# Patient Record
Sex: FEMALE | Race: BLACK OR AFRICAN AMERICAN | NOT HISPANIC OR LATINO | Employment: UNEMPLOYED | URBAN - NONMETROPOLITAN AREA
[De-identification: names, ages, dates, MRNs, and addresses within clinical notes are randomized per-mention and may not be internally consistent; named-entity substitution may affect disease eponyms.]

---

## 2018-01-02 ENCOUNTER — OFFICE VISIT (OUTPATIENT)
Dept: ENDOCRINOLOGY | Facility: CLINIC | Age: 38
End: 2018-01-02

## 2018-01-02 ENCOUNTER — LAB (OUTPATIENT)
Dept: LAB | Facility: HOSPITAL | Age: 38
End: 2018-01-02

## 2018-01-02 VITALS
BODY MASS INDEX: 40.41 KG/M2 | SYSTOLIC BLOOD PRESSURE: 122 MMHG | DIASTOLIC BLOOD PRESSURE: 72 MMHG | HEART RATE: 83 BPM | HEIGHT: 64 IN | WEIGHT: 236.7 LBS

## 2018-01-02 DIAGNOSIS — E89.0 POSTOPERATIVE HYPOTHYROIDISM: Primary | ICD-10-CM

## 2018-01-02 DIAGNOSIS — Z85.850 HISTORY OF THYROID CANCER: ICD-10-CM

## 2018-01-02 DIAGNOSIS — D50.8 OTHER IRON DEFICIENCY ANEMIA: Primary | ICD-10-CM

## 2018-01-02 DIAGNOSIS — E89.0 POSTOPERATIVE HYPOTHYROIDISM: ICD-10-CM

## 2018-01-02 DIAGNOSIS — E55.9 VITAMIN D DEFICIENCY: ICD-10-CM

## 2018-01-02 LAB
25(OH)D3 SERPL-MCNC: 25.8 NG/ML (ref 30–100)
ALBUMIN SERPL-MCNC: 4.1 G/DL (ref 3.4–4.8)
ALBUMIN/GLOB SERPL: 0.9 G/DL (ref 1.1–1.8)
ALP SERPL-CCNC: 102 U/L (ref 38–126)
ALT SERPL W P-5'-P-CCNC: 54 U/L (ref 9–52)
ANION GAP SERPL CALCULATED.3IONS-SCNC: 10 MMOL/L (ref 5–15)
AST SERPL-CCNC: 51 U/L (ref 14–36)
BASOPHILS # BLD AUTO: 0.01 10*3/MM3 (ref 0–0.2)
BASOPHILS NFR BLD AUTO: 0.2 % (ref 0–2)
BILIRUB SERPL-MCNC: 0.3 MG/DL (ref 0.2–1.3)
BUN BLD-MCNC: 8 MG/DL (ref 7–21)
BUN/CREAT SERPL: 11.4 (ref 7–25)
CALCIUM SPEC-SCNC: 8.6 MG/DL (ref 8.4–10.2)
CHLORIDE SERPL-SCNC: 102 MMOL/L (ref 95–110)
CO2 SERPL-SCNC: 26 MMOL/L (ref 22–31)
CREAT BLD-MCNC: 0.7 MG/DL (ref 0.5–1)
DEPRECATED RDW RBC AUTO: 48.3 FL (ref 36.4–46.3)
EOSINOPHIL # BLD AUTO: 0.12 10*3/MM3 (ref 0–0.7)
EOSINOPHIL NFR BLD AUTO: 2 % (ref 0–7)
ERYTHROCYTE [DISTWIDTH] IN BLOOD BY AUTOMATED COUNT: 16.7 % (ref 11.5–14.5)
GFR SERPL CREATININE-BSD FRML MDRD: 115 ML/MIN/1.73 (ref 64–149)
GLOBULIN UR ELPH-MCNC: 4.5 GM/DL (ref 2.3–3.5)
GLUCOSE BLD-MCNC: 80 MG/DL (ref 60–100)
HCT VFR BLD AUTO: 32.4 % (ref 35–45)
HGB BLD-MCNC: 10 G/DL (ref 12–15.5)
IMM GRANULOCYTES # BLD: 0.02 10*3/MM3 (ref 0–0.02)
IMM GRANULOCYTES NFR BLD: 0.3 % (ref 0–0.5)
LYMPHOCYTES # BLD AUTO: 2.43 10*3/MM3 (ref 0.6–4.2)
LYMPHOCYTES NFR BLD AUTO: 39.6 % (ref 10–50)
MCH RBC QN AUTO: 24.5 PG (ref 26.5–34)
MCHC RBC AUTO-ENTMCNC: 30.9 G/DL (ref 31.4–36)
MCV RBC AUTO: 79.4 FL (ref 80–98)
MONOCYTES # BLD AUTO: 0.49 10*3/MM3 (ref 0–0.9)
MONOCYTES NFR BLD AUTO: 8 % (ref 0–12)
NEUTROPHILS # BLD AUTO: 3.06 10*3/MM3 (ref 2–8.6)
NEUTROPHILS NFR BLD AUTO: 49.9 % (ref 37–80)
PLATELET # BLD AUTO: 334 10*3/MM3 (ref 150–450)
PMV BLD AUTO: 9.9 FL (ref 8–12)
POTASSIUM BLD-SCNC: 3.7 MMOL/L (ref 3.5–5.1)
PROT SERPL-MCNC: 8.6 G/DL (ref 6.3–8.6)
RBC # BLD AUTO: 4.08 10*6/MM3 (ref 3.77–5.16)
SODIUM BLD-SCNC: 138 MMOL/L (ref 137–145)
T4 FREE SERPL-MCNC: 1.09 NG/DL (ref 0.78–2.19)
TSH SERPL DL<=0.05 MIU/L-ACNC: 1.92 MIU/ML (ref 0.46–4.68)
WBC NRBC COR # BLD: 6.13 10*3/MM3 (ref 3.2–9.8)

## 2018-01-02 PROCEDURE — 82306 VITAMIN D 25 HYDROXY: CPT

## 2018-01-02 PROCEDURE — 80053 COMPREHEN METABOLIC PANEL: CPT

## 2018-01-02 PROCEDURE — 86800 THYROGLOBULIN ANTIBODY: CPT

## 2018-01-02 PROCEDURE — 84439 ASSAY OF FREE THYROXINE: CPT

## 2018-01-02 PROCEDURE — 84432 ASSAY OF THYROGLOBULIN: CPT

## 2018-01-02 PROCEDURE — 84443 ASSAY THYROID STIM HORMONE: CPT

## 2018-01-02 PROCEDURE — 99205 OFFICE O/P NEW HI 60 MIN: CPT | Performed by: INTERNAL MEDICINE

## 2018-01-02 PROCEDURE — 36415 COLL VENOUS BLD VENIPUNCTURE: CPT

## 2018-01-02 PROCEDURE — 85025 COMPLETE CBC W/AUTO DIFF WBC: CPT

## 2018-01-02 RX ORDER — FERROUS SULFATE 325(65) MG
325 TABLET ORAL
COMMUNITY
End: 2018-05-24 | Stop reason: SDUPTHER

## 2018-01-02 RX ORDER — LEVOTHYROXINE SODIUM 112 UG/1
112 TABLET ORAL DAILY
Qty: 90 TABLET | Refills: 3 | Status: SHIPPED | OUTPATIENT
Start: 2018-01-02 | End: 2018-05-24 | Stop reason: DRUGHIGH

## 2018-01-02 RX ORDER — LEVOTHYROXINE SODIUM 112 UG/1
112 TABLET ORAL DAILY
COMMUNITY
End: 2018-01-02 | Stop reason: SDUPTHER

## 2018-01-02 RX ORDER — OMEGA-3S/DHA/EPA/FISH OIL/D3 300MG-1000
400 CAPSULE ORAL DAILY
COMMUNITY
End: 2019-03-29

## 2018-01-02 NOTE — PROGRESS NOTES
Vickie Whitten is a 37 y.o. female who presents for  evaluation of   Chief Complaint   Patient presents with   • Thyroid Problem        Thyroid Cancer, Postoperative Hypothyroidism    Duration Thyroid Ca Dx in 12-16    Timing - Cancer is cured, now hypothyroid    Quality -  Right 2.5 cm Follicular Ca w capsular invasion but no lymphatic or vascular invasion. Left lobe w pathology for Hashimoto's     Severity -  mild    Complications - none    Current symptoms/problems  none     Alleviating Factors: Compliance with Brand Name synthroid   Side Effects  none        Past Medical History:   Diagnosis Date   • History of thyroid cancer 1/2/2018   • Postoperative hypothyroidism 1/2/2018   • Vitamin D deficiency 1/2/2018     Family History   Problem Relation Age of Onset   • Thyroid cancer Neg Hx      Social History   Substance Use Topics   • Smoking status: Never Smoker   • Smokeless tobacco: Never Used   • Alcohol use None         Current Outpatient Prescriptions:   •  cholecalciferol (VITAMIN D3) 400 units tablet, Take 400 Units by mouth Daily., Disp: , Rfl:   •  ferrous sulfate 325 (65 FE) MG tablet, Take 325 mg by mouth Daily With Breakfast., Disp: , Rfl:   •  levothyroxine (SYNTHROID, LEVOTHROID) 112 MCG tablet, Take 1 tablet by mouth Daily. Brand name synthroid, Disp: 90 tablet, Rfl: 3    Review of Systems    Review of Systems   Constitutional: Negative for activity change, appetite change, chills, diaphoresis, fatigue, fever and unexpected weight change.   HENT: Negative for congestion, dental problem, drooling, ear discharge, ear pain, facial swelling, mouth sores, postnasal drip, rhinorrhea, sinus pressure, sore throat, tinnitus, trouble swallowing and voice change.    Eyes: Negative for photophobia, pain, discharge, redness, itching and visual disturbance.   Respiratory: Negative for apnea, cough, choking, chest tightness, shortness of breath, wheezing and stridor.    Cardiovascular: Negative for chest pain,  "palpitations and leg swelling.   Gastrointestinal: Negative for abdominal distention, abdominal pain, constipation, diarrhea, nausea and vomiting.   Endocrine: Negative for cold intolerance, heat intolerance, polydipsia, polyphagia and polyuria.   Genitourinary: Negative for decreased urine volume, difficulty urinating, dysuria, flank pain, frequency, hematuria and urgency.   Musculoskeletal: Negative for arthralgias, back pain, gait problem, joint swelling, myalgias, neck pain and neck stiffness.   Skin: Negative for color change, pallor, rash and wound.   Allergic/Immunologic: Negative for immunocompromised state.   Neurological: Negative for dizziness, tremors, seizures, syncope, facial asymmetry, speech difficulty, weakness, light-headedness, numbness and headaches.   Hematological: Negative for adenopathy.   Psychiatric/Behavioral: Negative for agitation, behavioral problems, confusion, decreased concentration, dysphoric mood, hallucinations, self-injury, sleep disturbance and suicidal ideas. The patient is not nervous/anxious and is not hyperactive.         Objective:   /72 (BP Location: Right arm, Patient Position: Sitting, Cuff Size: Adult)  Pulse 83  Ht 162.6 cm (64\")  Wt 107 kg (236 lb 11.2 oz)  BMI 40.63 kg/m2    Physical Exam   Constitutional: She is oriented to person, place, and time. She appears well-developed.   HENT:   Head: Normocephalic.   Right Ear: External ear normal.   Left Ear: External ear normal.   Nose: Nose normal.   Eyes: Conjunctivae and EOM are normal. No scleral icterus.   Neck: Normal range of motion. Neck supple. No tracheal deviation present. No thyromegaly present.   Cardiovascular: Normal rate, regular rhythm, normal heart sounds and intact distal pulses.  Exam reveals no gallop and no friction rub.    No murmur heard.  Pulmonary/Chest: Effort normal and breath sounds normal. No stridor. No respiratory distress. She has no wheezes. She has no rales. She exhibits no " tenderness.   Abdominal: Soft. Bowel sounds are normal. She exhibits no distension and no mass. There is no tenderness. There is no rebound and no guarding.   Musculoskeletal: Normal range of motion. She exhibits no tenderness or deformity.   Lymphadenopathy:     She has no cervical adenopathy.   Neurological: She is alert and oriented to person, place, and time. She displays normal reflexes. She exhibits normal muscle tone. Coordination normal.   Skin: No rash noted. No erythema. No pallor.   Psychiatric: She has a normal mood and affect. Her behavior is normal. Judgment and thought content normal.         Lab Review    No results found for: THYROGLOBULN    No results found for: THYROGLB    No results found for: THGAB    Lab Results   Component Value Date    TSH 1.920 01/02/2018       Lab Results   Component Value Date    FREET4 1.09 01/02/2018         Assessment/Plan       ICD-10-CM ICD-9-CM   1. Postoperative hypothyroidism E89.0 244.0   2. History of thyroid cancer Z85.850 V10.87   3. Vitamin D deficiency E55.9 268.9     Presently on Synthroid 112 µg daily.    Stop Synthroid today and in about 3 weeks do total body thyroid scan in [facility] and a thyroid ultrasound.  The week before she is to do a TSH to prove that she is optimized for the scan.    Once she does the thyroid scan she can resume Synthroid and do blood work 4 weeks later.     labs q 3 to 4 months and appts q year     MDM  Number of Diagnoses or Management Options  History of thyroid cancer: new, needed workup  Postoperative hypothyroidism: new, needed workup  Vitamin D deficiency: new, needed workup     Amount and/or Complexity of Data Reviewed  Clinical lab tests: ordered  Tests in the radiology section of CPT®: ordered  Review and summarize past medical records: yes    Risk of Complications, Morbidity, and/or Mortality  Presenting problems: moderate  Diagnostic procedures: minimal  Management options: moderate      3-18    Tg 0.4 with TSH  of 40  NM scan , neck activity    Refer for allen ablation

## 2018-01-02 NOTE — PATIENT INSTRUCTIONS
Labs today to see where we are but you stop the medicine today     In about 3 weeks we will schedule for a whole body scan   Do labs the Thursday before to prove you are ready    The pill is typically given on Wednesdays and the Scan typically done the following Monday     After they do the imaging you resume your thyroid pill    6 weeks after your started your thyroid pill you repeat labs

## 2018-01-10 LAB
THYROGLOB AB SERPL-ACNC: 40.4 IU/ML (ref 0–0.9)
THYROGLOBULIN (TG-RIA): <2 NG/ML

## 2018-02-27 ENCOUNTER — HOSPITAL ENCOUNTER (OUTPATIENT)
Dept: NUCLEAR MEDICINE | Facility: HOSPITAL | Age: 38
Discharge: HOME | End: 2018-02-27

## 2018-02-27 DIAGNOSIS — E55.9 VITAMIN D DEFICIENCY: ICD-10-CM

## 2018-02-27 DIAGNOSIS — Z85.850 HISTORY OF THYROID CANCER: ICD-10-CM

## 2018-02-27 DIAGNOSIS — E89.0 POSTOPERATIVE HYPOTHYROIDISM: ICD-10-CM

## 2018-02-27 PROCEDURE — 0 SODIUM IODIDE CAPSULE: Performed by: INTERNAL MEDICINE

## 2018-02-27 PROCEDURE — A9528 IODINE I-131 IODIDE CAP, DX: HCPCS | Performed by: INTERNAL MEDICINE

## 2018-02-27 RX ADMIN — SODIUM IODIDE I 131 1 CAPSULE: 100 CAPSULE ORAL at 10:11

## 2018-03-01 ENCOUNTER — HOSPITAL ENCOUNTER (OUTPATIENT)
Dept: NUCLEAR MEDICINE | Facility: HOSPITAL | Age: 38
Discharge: HOME | End: 2018-03-01

## 2018-03-01 ENCOUNTER — HOSPITAL ENCOUNTER (OUTPATIENT)
Dept: RADIATION ONCOLOGY | Facility: HOSPITAL | Age: 38
Setting detail: RADIATION/ONCOLOGY SERIES
End: 2018-03-01

## 2018-03-01 ENCOUNTER — TRANSCRIBE ORDERS (OUTPATIENT)
Dept: ADMINISTRATIVE | Facility: HOSPITAL | Age: 38
End: 2018-03-01

## 2018-03-01 PROCEDURE — 78018 THYROID MET IMAGING BODY: CPT

## 2018-03-10 DIAGNOSIS — E89.0 POSTOPERATIVE HYPOTHYROIDISM: ICD-10-CM

## 2018-03-10 DIAGNOSIS — Z85.850 HISTORY OF THYROID CANCER: Primary | ICD-10-CM

## 2018-03-10 DIAGNOSIS — E55.9 VITAMIN D DEFICIENCY: ICD-10-CM

## 2018-03-15 ENCOUNTER — HOSPITAL ENCOUNTER (OUTPATIENT)
Dept: ULTRASOUND IMAGING | Facility: HOSPITAL | Age: 38
Discharge: HOME | End: 2018-03-15
Admitting: INTERNAL MEDICINE

## 2018-03-15 PROCEDURE — 76536 US EXAM OF HEAD AND NECK: CPT

## 2018-03-29 ENCOUNTER — CONSULT (OUTPATIENT)
Dept: RADIATION ONCOLOGY | Facility: HOSPITAL | Age: 38
End: 2018-03-29

## 2018-03-29 VITALS
WEIGHT: 235 LBS | HEIGHT: 64 IN | BODY MASS INDEX: 40.12 KG/M2 | DIASTOLIC BLOOD PRESSURE: 82 MMHG | SYSTOLIC BLOOD PRESSURE: 118 MMHG

## 2018-03-29 DIAGNOSIS — Z85.850 ENCOUNTER FOR FOLLOW-UP SURVEILLANCE OF THYROID CANCER: ICD-10-CM

## 2018-03-29 DIAGNOSIS — E89.0 POSTOPERATIVE HYPOTHYROIDISM: ICD-10-CM

## 2018-03-29 DIAGNOSIS — R22.1 MASS OF THYROID REGION: Primary | ICD-10-CM

## 2018-03-29 DIAGNOSIS — Z78.9 NON-SMOKER: ICD-10-CM

## 2018-03-29 DIAGNOSIS — Z85.850 HISTORY OF THYROID CANCER: ICD-10-CM

## 2018-03-29 DIAGNOSIS — Z08 ENCOUNTER FOR FOLLOW-UP SURVEILLANCE OF THYROID CANCER: ICD-10-CM

## 2018-03-29 PROCEDURE — G0463 HOSPITAL OUTPT CLINIC VISIT: HCPCS | Performed by: RADIOLOGY

## 2018-03-29 RX ORDER — LIOTHYRONINE SODIUM 50 UG/1
50 TABLET ORAL DAILY
Qty: 30 TABLET | Refills: 0 | Status: SHIPPED | OUTPATIENT
Start: 2018-03-29 | End: 2018-05-21

## 2018-04-02 PROBLEM — R22.1 MASS OF THYROID REGION: Status: ACTIVE | Noted: 2018-04-02

## 2018-04-17 ENCOUNTER — TRANSCRIBE ORDERS (OUTPATIENT)
Dept: ADMINISTRATIVE | Facility: HOSPITAL | Age: 38
End: 2018-04-17

## 2018-04-17 ENCOUNTER — HOSPITAL ENCOUNTER (OUTPATIENT)
Dept: RADIATION ONCOLOGY | Facility: HOSPITAL | Age: 38
Setting detail: RADIATION/ONCOLOGY SERIES
End: 2018-04-17

## 2018-04-17 ENCOUNTER — HOSPITAL ENCOUNTER (OUTPATIENT)
Dept: NUCLEAR MEDICINE | Facility: HOSPITAL | Age: 38
Discharge: HOME | End: 2018-04-17

## 2018-04-17 ENCOUNTER — DOCUMENTATION (OUTPATIENT)
Dept: RADIATION ONCOLOGY | Facility: HOSPITAL | Age: 38
End: 2018-04-17

## 2018-04-17 ENCOUNTER — HOSPITAL ENCOUNTER (OUTPATIENT)
Dept: RADIATION ONCOLOGY | Facility: HOSPITAL | Age: 38
Setting detail: RADIATION/ONCOLOGY SERIES
Discharge: HOME | End: 2018-04-17
Attending: RADIOLOGY

## 2018-04-17 ENCOUNTER — LAB (OUTPATIENT)
Dept: LAB | Facility: HOSPITAL | Age: 38
End: 2018-04-17

## 2018-04-17 DIAGNOSIS — E89.0 POSTOPERATIVE HYPOTHYROIDISM: ICD-10-CM

## 2018-04-17 DIAGNOSIS — Z78.9 NON-SMOKER: ICD-10-CM

## 2018-04-17 DIAGNOSIS — R22.1 MASS OF THYROID REGION: ICD-10-CM

## 2018-04-17 DIAGNOSIS — Z85.850 HISTORY OF THYROID CANCER: ICD-10-CM

## 2018-04-17 LAB — HCG SERPL QL: NEGATIVE

## 2018-04-17 PROCEDURE — 36415 COLL VENOUS BLD VENIPUNCTURE: CPT

## 2018-04-17 PROCEDURE — 84703 CHORIONIC GONADOTROPIN ASSAY: CPT | Performed by: PHYSICIAN ASSISTANT

## 2018-04-17 PROCEDURE — 79005 NUCLEAR RX ORAL ADMIN: CPT | Performed by: RADIOLOGY

## 2018-04-17 PROCEDURE — 0 SODIUM IODIDE CAPSULE: Performed by: PHYSICIAN ASSISTANT

## 2018-04-17 PROCEDURE — 77370 RADIATION PHYSICS CONSULT: CPT | Performed by: RADIOLOGY

## 2018-04-17 PROCEDURE — A9517 I131 IODIDE CAP, RX: HCPCS | Performed by: PHYSICIAN ASSISTANT

## 2018-04-17 RX ADMIN — SODIUM IODIDE I 131 1 CAPSULE: 100 CAPSULE ORAL at 10:30

## 2018-04-17 NOTE — PROGRESS NOTES
Patient here for I-131 treatment.   TSH (55.05) and Thyroglobulin (0.5) as well as thyroglobulin AB (107) results from [facility] lab in chart.  Reviewed diet resumption tomorrow night at supper.  Gave copy of I-131 Safety instructions.  Answered questions.  Dr. Winston has spoken with patient.  Consent signed.

## 2018-04-19 ENCOUNTER — HOSPITAL ENCOUNTER (OUTPATIENT)
Dept: NUCLEAR MEDICINE | Facility: HOSPITAL | Age: 38
Discharge: HOME | End: 2018-04-19

## 2018-04-19 DIAGNOSIS — R22.1 MASS OF THYROID REGION: ICD-10-CM

## 2018-04-19 DIAGNOSIS — E89.0 POSTOPERATIVE HYPOTHYROIDISM: ICD-10-CM

## 2018-04-19 DIAGNOSIS — Z85.850 HISTORY OF THYROID CANCER: ICD-10-CM

## 2018-04-19 DIAGNOSIS — Z78.9 NON-SMOKER: ICD-10-CM

## 2018-04-19 PROCEDURE — 78018 THYROID MET IMAGING BODY: CPT

## 2018-05-21 ENCOUNTER — LAB (OUTPATIENT)
Dept: LAB | Facility: HOSPITAL | Age: 38
End: 2018-05-21

## 2018-05-21 ENCOUNTER — OFFICE VISIT (OUTPATIENT)
Dept: ENDOCRINOLOGY | Facility: CLINIC | Age: 38
End: 2018-05-21

## 2018-05-21 VITALS
HEIGHT: 64 IN | DIASTOLIC BLOOD PRESSURE: 78 MMHG | BODY MASS INDEX: 39.09 KG/M2 | WEIGHT: 229 LBS | SYSTOLIC BLOOD PRESSURE: 118 MMHG | HEART RATE: 83 BPM

## 2018-05-21 DIAGNOSIS — E55.9 VITAMIN D DEFICIENCY: ICD-10-CM

## 2018-05-21 DIAGNOSIS — Z85.850 HISTORY OF THYROID CANCER: ICD-10-CM

## 2018-05-21 DIAGNOSIS — E89.0 POSTOPERATIVE HYPOTHYROIDISM: Primary | ICD-10-CM

## 2018-05-21 DIAGNOSIS — E89.0 POSTOPERATIVE HYPOTHYROIDISM: ICD-10-CM

## 2018-05-21 LAB
25(OH)D3 SERPL-MCNC: 38.9 NG/ML (ref 30–100)
ALBUMIN SERPL-MCNC: 4 G/DL (ref 3.4–4.8)
ALBUMIN/GLOB SERPL: 0.9 G/DL (ref 1.1–1.8)
ALP SERPL-CCNC: 81 U/L (ref 38–126)
ALT SERPL W P-5'-P-CCNC: 35 U/L (ref 9–52)
ANION GAP SERPL CALCULATED.3IONS-SCNC: 10 MMOL/L (ref 5–15)
AST SERPL-CCNC: 36 U/L (ref 14–36)
BASOPHILS # BLD AUTO: 0 10*3/MM3 (ref 0–0.2)
BASOPHILS NFR BLD AUTO: 0 % (ref 0–2)
BILIRUB SERPL-MCNC: 0.2 MG/DL (ref 0.2–1.3)
BUN BLD-MCNC: 9 MG/DL (ref 7–21)
BUN/CREAT SERPL: 12 (ref 7–25)
CALCIUM SPEC-SCNC: 8.7 MG/DL (ref 8.4–10.2)
CHLORIDE SERPL-SCNC: 103 MMOL/L (ref 95–110)
CO2 SERPL-SCNC: 29 MMOL/L (ref 22–31)
CREAT BLD-MCNC: 0.75 MG/DL (ref 0.5–1)
DEPRECATED RDW RBC AUTO: 55.7 FL (ref 36.4–46.3)
EOSINOPHIL # BLD AUTO: 0.05 10*3/MM3 (ref 0–0.7)
EOSINOPHIL NFR BLD AUTO: 1.2 % (ref 0–7)
ERYTHROCYTE [DISTWIDTH] IN BLOOD BY AUTOMATED COUNT: 18.8 % (ref 11.5–14.5)
GFR SERPL CREATININE-BSD FRML MDRD: 107 ML/MIN/1.73 (ref 64–149)
GLOBULIN UR ELPH-MCNC: 4.5 GM/DL (ref 2.3–3.5)
GLUCOSE BLD-MCNC: 86 MG/DL (ref 60–100)
HCT VFR BLD AUTO: 29.5 % (ref 35–45)
HGB BLD-MCNC: 9.1 G/DL (ref 12–15.5)
IMM GRANULOCYTES # BLD: 0.01 10*3/MM3 (ref 0–0.02)
IMM GRANULOCYTES NFR BLD: 0.2 % (ref 0–0.5)
IRON 24H UR-MRATE: 22 MCG/DL (ref 37–170)
IRON SATN MFR SERPL: 5 % (ref 15–50)
LYMPHOCYTES # BLD AUTO: 1.52 10*3/MM3 (ref 0.6–4.2)
LYMPHOCYTES NFR BLD AUTO: 37.6 % (ref 10–50)
MCH RBC QN AUTO: 25 PG (ref 26.5–34)
MCHC RBC AUTO-ENTMCNC: 30.8 G/DL (ref 31.4–36)
MCV RBC AUTO: 81 FL (ref 80–98)
MONOCYTES # BLD AUTO: 0.28 10*3/MM3 (ref 0–0.9)
MONOCYTES NFR BLD AUTO: 6.9 % (ref 0–12)
NEUTROPHILS # BLD AUTO: 2.18 10*3/MM3 (ref 2–8.6)
NEUTROPHILS NFR BLD AUTO: 54.1 % (ref 37–80)
PLATELET # BLD AUTO: 238 10*3/MM3 (ref 150–450)
PMV BLD AUTO: 9.3 FL (ref 8–12)
POTASSIUM BLD-SCNC: 3.6 MMOL/L (ref 3.5–5.1)
PROT SERPL-MCNC: 8.5 G/DL (ref 6.3–8.6)
RBC # BLD AUTO: 3.64 10*6/MM3 (ref 3.77–5.16)
SODIUM BLD-SCNC: 142 MMOL/L (ref 137–145)
T4 FREE SERPL-MCNC: 0.8 NG/DL (ref 0.78–2.19)
TIBC SERPL-MCNC: 401 MCG/DL (ref 265–497)
TSH SERPL DL<=0.05 MIU/L-ACNC: 13.5 MIU/ML (ref 0.46–4.68)
WBC NRBC COR # BLD: 4.04 10*3/MM3 (ref 3.2–9.8)

## 2018-05-21 PROCEDURE — 84443 ASSAY THYROID STIM HORMONE: CPT

## 2018-05-21 PROCEDURE — 80053 COMPREHEN METABOLIC PANEL: CPT

## 2018-05-21 PROCEDURE — 83540 ASSAY OF IRON: CPT | Performed by: INTERNAL MEDICINE

## 2018-05-21 PROCEDURE — 82306 VITAMIN D 25 HYDROXY: CPT

## 2018-05-21 PROCEDURE — 99214 OFFICE O/P EST MOD 30 MIN: CPT | Performed by: NURSE PRACTITIONER

## 2018-05-21 PROCEDURE — 86800 THYROGLOBULIN ANTIBODY: CPT

## 2018-05-21 PROCEDURE — 84439 ASSAY OF FREE THYROXINE: CPT

## 2018-05-21 PROCEDURE — 83550 IRON BINDING TEST: CPT | Performed by: INTERNAL MEDICINE

## 2018-05-21 PROCEDURE — 84432 ASSAY OF THYROGLOBULIN: CPT

## 2018-05-21 PROCEDURE — 36415 COLL VENOUS BLD VENIPUNCTURE: CPT | Performed by: INTERNAL MEDICINE

## 2018-05-21 PROCEDURE — 85025 COMPLETE CBC W/AUTO DIFF WBC: CPT

## 2018-05-21 NOTE — PROGRESS NOTES
Subjective    Vickie Whitten is a 37 y.o. female. she is here today for follow-up.    History of Present Illness       Thyroid Cancer, Postoperative Hypothyroidism     Duration Thyroid Ca Dx in 12-16     Timing - Cancer is cured, now hypothyroid     Quality -  Right 2.5 cm Follicular Ca w capsular invasion but no lymphatic or vascular invasion. Left lobe w pathology for Hashimoto's      Severity -  mild     Complications - none     Current symptoms/problems  none      Alleviating Factors: Compliance with Brand Name synthroid   Side Effects  none            Evaluation history:          EXAMINATION:   US HEAD NECK SOFT TISSUE-  3/15/2018 9:51 AM CDT     HISTORY: Status post thyroidectomy     Images are obtained transverse longitudinal direction usual manner.  Right lobe of thyroid gland is surgically absent. Residual tissue is  noted in the left thyroid bed. There is a 6 x 7 x 32 mm residual  fragment of thyroid tissue.     This has uniform sonographic appearance color flow is present.     IMPRESSION 6 x 7 x 32 mm residual tissue in the left neck.  This report was finalized on 03/15/2018 09:52 by Dr. Shashank Alba MD.        History:  37-year-old evaluated for thyroid cancer.     Reference:  Thyroid whole body scan March 1, 2018.     TECHNIQUE:  103 mCi I-131 was administered orally. Following a 48 hour delay  anterior and posterior whole-body images acquired.     FINDINGS:  Thyroid bed activity is noted indicating residual thyroid. Normal  gastrointestinal activity and urinary bladder activity is present. No  abnormal foci of radiotracer to suggest distant metastatic disease.        IMPRESSION:  No evidence of distant metastatic disease.  This report was finalized on 04/19/2018 15:35 by  Soham Buckley, .          TSH   Date Value Ref Range Status   01/02/2018 1.920 0.460 - 4.680 mIU/mL Final     Free T4   Date Value Ref Range Status   01/02/2018 1.09 0.78 - 2.19 ng/dL Final       Current medications:  Current Outpatient  Prescriptions   Medication Sig Dispense Refill   • cholecalciferol (VITAMIN D3) 400 units tablet Take 400 Units by mouth Daily.     • ferrous sulfate 325 (65 FE) MG tablet Take 325 mg by mouth Daily With Breakfast.     • levothyroxine (SYNTHROID, LEVOTHROID) 112 MCG tablet Take 1 tablet by mouth Daily. Brand name synthroid 90 tablet 3   • liothyronine (CYTOMEL) 50 MCG tablet Take 1 tablet by mouth Daily. 30 tablet 0     No current facility-administered medications for this visit.        The following portions of the patient's history were reviewed and updated as appropriate:   Past Medical History:   Diagnosis Date   • History of thyroid cancer 1/2/2018   • Postoperative hypothyroidism 1/2/2018   • Vitamin D deficiency 1/2/2018     No past surgical history on file.  Family History   Problem Relation Age of Onset   • Thyroid cancer Neg Hx      OB History     No data available        No Known Allergies  Social History     Social History   • Marital status: Unknown     Social History Main Topics   • Smoking status: Never Smoker   • Smokeless tobacco: Never Used   • Drug use: Unknown     Other Topics Concern   • Not on file       Review of Systems  Review of Systems   Constitutional: Negative for activity change, appetite change, diaphoresis and fatigue.   HENT: Negative for facial swelling, sneezing, sore throat, tinnitus, trouble swallowing and voice change.    Eyes: Negative for photophobia, pain, discharge, redness, itching and visual disturbance.   Respiratory: Negative for apnea, cough, choking, chest tightness and shortness of breath.    Cardiovascular: Negative for chest pain, palpitations and leg swelling.   Gastrointestinal: Negative for abdominal distention, abdominal pain, constipation, diarrhea, nausea and vomiting.   Endocrine: Negative for cold intolerance, heat intolerance, polydipsia, polyphagia and polyuria.   Genitourinary: Negative for difficulty urinating, dysuria, frequency, hematuria and urgency.  "  Musculoskeletal: Negative for arthralgias, back pain, gait problem, joint swelling, myalgias, neck pain and neck stiffness.   Skin: Negative for color change, pallor, rash and wound.   Neurological: Negative for dizziness, tremors, weakness, light-headedness, numbness and headaches.   Hematological: Negative for adenopathy. Does not bruise/bleed easily.   Psychiatric/Behavioral: Negative for behavioral problems, confusion and sleep disturbance.        Objective    /78 (BP Location: Right arm, Patient Position: Sitting, Cuff Size: Adult)   Pulse 83   Ht 162.6 cm (64\")   Wt 104 kg (229 lb)   BMI 39.31 kg/m²   Physical Exam   Constitutional: She is oriented to person, place, and time. She appears well-developed and well-nourished. No distress.   HENT:   Head: Normocephalic and atraumatic.   Right Ear: External ear normal.   Left Ear: External ear normal.   Nose: Nose normal.   Eyes: Conjunctivae and EOM are normal. Pupils are equal, round, and reactive to light.   Neck: Normal range of motion. Neck supple. No tracheal deviation present.   Thyroid surgically absent   Cardiovascular: Normal rate, regular rhythm and normal heart sounds.    No murmur heard.  Pulmonary/Chest: Effort normal and breath sounds normal. No respiratory distress. She has no wheezes.   Abdominal: Soft. Bowel sounds are normal. There is no tenderness. There is no rebound and no guarding.   Musculoskeletal: Normal range of motion. She exhibits no edema, tenderness or deformity.   Neurological: She is alert and oriented to person, place, and time. No cranial nerve deficit.   Skin: Skin is warm and dry. No rash noted.   Psychiatric: She has a normal mood and affect. Her behavior is normal. Judgment and thought content normal.       Lab Review  Lab Results   Component Value Date    TSH 1.920 01/02/2018     Lab Results   Component Value Date    FREET4 1.09 01/02/2018        Assessment/Plan      1. Postoperative hypothyroidism    2. History of " thyroid cancer    . This diagnosis was discussed and reviewed with the patient including the advantages of drug therapy.     1. Orders placed during this encounter include:  No orders of the defined types were placed in this encounter.      Medications prescribed:  Outpatient Encounter Prescriptions as of 5/21/2018   Medication Sig Dispense Refill   • cholecalciferol (VITAMIN D3) 400 units tablet Take 400 Units by mouth Daily.     • ferrous sulfate 325 (65 FE) MG tablet Take 325 mg by mouth Daily With Breakfast.     • levothyroxine (SYNTHROID, LEVOTHROID) 112 MCG tablet Take 1 tablet by mouth Daily. Brand name synthroid 90 tablet 3   • liothyronine (CYTOMEL) 50 MCG tablet Take 1 tablet by mouth Daily. 30 tablet 0     No facility-administered encounter medications on file as of 5/21/2018.      Hypothyroidism postsurgical    History of thyroid cancer     Presently on Synthroid 112 µg daily.     Lab Results   Component Value Date    TSH 1.920 01/02/2018     Component      Latest Ref Rng & Units 1/2/2018   Thyroglobulin      ng/mL <2.0     Component      Latest Ref Rng & Units 1/2/2018   Thyroglobulin Ab      0.0 - 0.9 IU/mL 40.4 (H)           She saw Dr. Winston     April 17, 2018 received 102.9 MCI I -131      Need new labs today and will call with results      4. No Follow-up on file.

## 2018-05-24 ENCOUNTER — TELEPHONE (OUTPATIENT)
Dept: ENDOCRINOLOGY | Facility: CLINIC | Age: 38
End: 2018-05-24

## 2018-05-24 RX ORDER — LEVOTHYROXINE SODIUM 125 MCG
125 TABLET ORAL DAILY
Qty: 30 TABLET | Refills: 5 | Status: SHIPPED | OUTPATIENT
Start: 2018-05-24 | End: 2018-11-18 | Stop reason: SDUPTHER

## 2018-05-24 RX ORDER — FERROUS SULFATE 325(65) MG
TABLET ORAL
Qty: 60 TABLET | Refills: 5 | Status: SHIPPED | OUTPATIENT
Start: 2018-05-24 | End: 2019-03-29

## 2018-05-24 NOTE — TELEPHONE ENCOUNTER
----- Message from KHADIJAH Sapp sent at 5/24/2018 10:06 AM CDT -----  Increase to 125 mcg ; repeat labs in one month

## 2018-05-27 LAB
THYROGLOB AB SERPL-ACNC: 138.3 IU/ML (ref 0–0.9)
THYROGLOBULIN (TG-RIA): 3.2 NG/ML

## 2018-05-29 DIAGNOSIS — E89.0 POSTOPERATIVE HYPOTHYROIDISM: Primary | ICD-10-CM

## 2018-05-30 ENCOUNTER — TELEPHONE (OUTPATIENT)
Dept: ENDOCRINOLOGY | Facility: CLINIC | Age: 38
End: 2018-05-30

## 2018-07-30 ENCOUNTER — LAB (OUTPATIENT)
Dept: LAB | Facility: HOSPITAL | Age: 38
End: 2018-07-30

## 2018-07-30 DIAGNOSIS — Z85.850 HISTORY OF THYROID CANCER: ICD-10-CM

## 2018-07-30 DIAGNOSIS — E89.0 POSTOPERATIVE HYPOTHYROIDISM: ICD-10-CM

## 2018-07-30 DIAGNOSIS — E55.9 VITAMIN D DEFICIENCY: ICD-10-CM

## 2018-07-30 LAB — TSH SERPL DL<=0.05 MIU/L-ACNC: 0.22 MIU/ML (ref 0.47–4.68)

## 2018-07-30 PROCEDURE — 36415 COLL VENOUS BLD VENIPUNCTURE: CPT

## 2018-07-30 PROCEDURE — 84443 ASSAY THYROID STIM HORMONE: CPT | Performed by: INTERNAL MEDICINE

## 2018-11-19 RX ORDER — LEVOTHYROXINE SODIUM 125 MCG
TABLET ORAL
Qty: 90 TABLET | Refills: 0 | Status: SHIPPED | OUTPATIENT
Start: 2018-11-19 | End: 2019-02-13 | Stop reason: SDUPTHER

## 2019-02-13 RX ORDER — LEVOTHYROXINE SODIUM 125 MCG
TABLET ORAL
Qty: 90 TABLET | Refills: 0 | Status: SHIPPED | OUTPATIENT
Start: 2019-02-13 | End: 2019-04-10 | Stop reason: SDUPTHER

## 2019-03-29 ENCOUNTER — APPOINTMENT (OUTPATIENT)
Dept: LAB | Facility: HOSPITAL | Age: 39
End: 2019-03-29

## 2019-03-29 ENCOUNTER — OFFICE VISIT (OUTPATIENT)
Dept: ENDOCRINOLOGY | Facility: CLINIC | Age: 39
End: 2019-03-29

## 2019-03-29 VITALS
DIASTOLIC BLOOD PRESSURE: 76 MMHG | WEIGHT: 237 LBS | SYSTOLIC BLOOD PRESSURE: 112 MMHG | HEIGHT: 64 IN | OXYGEN SATURATION: 98 % | HEART RATE: 71 BPM | BODY MASS INDEX: 40.46 KG/M2

## 2019-03-29 DIAGNOSIS — E89.0 POSTOPERATIVE HYPOTHYROIDISM: Primary | ICD-10-CM

## 2019-03-29 DIAGNOSIS — E55.9 VITAMIN D DEFICIENCY: ICD-10-CM

## 2019-03-29 DIAGNOSIS — Z85.850 HISTORY OF THYROID CANCER: ICD-10-CM

## 2019-03-29 DIAGNOSIS — D50.8 OTHER IRON DEFICIENCY ANEMIA: ICD-10-CM

## 2019-03-29 PROBLEM — R22.1 MASS OF THYROID REGION: Status: RESOLVED | Noted: 2018-04-02 | Resolved: 2019-03-29

## 2019-03-29 LAB
25(OH)D3 SERPL-MCNC: 27.7 NG/ML (ref 30–100)
ALBUMIN SERPL-MCNC: 4.1 G/DL (ref 3.5–5.2)
ALBUMIN/GLOB SERPL: 1.1 G/DL
ALP SERPL-CCNC: 93 U/L (ref 39–117)
ALT SERPL W P-5'-P-CCNC: 16 U/L (ref 1–33)
ANION GAP SERPL CALCULATED.3IONS-SCNC: 11.3 MMOL/L
AST SERPL-CCNC: 28 U/L (ref 1–32)
BASOPHILS # BLD AUTO: 0.01 10*3/MM3 (ref 0–0.2)
BASOPHILS NFR BLD AUTO: 0.2 % (ref 0–1.5)
BILIRUB SERPL-MCNC: 0.3 MG/DL (ref 0.2–1.2)
BUN BLD-MCNC: 7 MG/DL (ref 6–20)
BUN/CREAT SERPL: 10.1 (ref 7–25)
CALCIUM SPEC-SCNC: 8.5 MG/DL (ref 8.6–10.5)
CHLORIDE SERPL-SCNC: 102 MMOL/L (ref 98–107)
CO2 SERPL-SCNC: 25.7 MMOL/L (ref 22–29)
CREAT BLD-MCNC: 0.69 MG/DL (ref 0.57–1)
DEPRECATED RDW RBC AUTO: 54.4 FL (ref 37–54)
EOSINOPHIL # BLD AUTO: 0.1 10*3/MM3 (ref 0–0.4)
EOSINOPHIL NFR BLD AUTO: 2.2 % (ref 0.3–6.2)
ERYTHROCYTE [DISTWIDTH] IN BLOOD BY AUTOMATED COUNT: 18.8 % (ref 12.3–15.4)
GFR SERPL CREATININE-BSD FRML MDRD: 117 ML/MIN/1.73
GLOBULIN UR ELPH-MCNC: 3.9 GM/DL
GLUCOSE BLD-MCNC: 88 MG/DL (ref 65–99)
HCT VFR BLD AUTO: 32.6 % (ref 34–46.6)
HGB BLD-MCNC: 9.6 G/DL (ref 12–15.9)
IMM GRANULOCYTES # BLD AUTO: 0.02 10*3/MM3 (ref 0–0.05)
IMM GRANULOCYTES NFR BLD AUTO: 0.4 % (ref 0–0.5)
IRON 24H UR-MRATE: 25 MCG/DL (ref 37–145)
IRON SATN MFR SERPL: 6 % (ref 20–50)
LYMPHOCYTES # BLD AUTO: 2.05 10*3/MM3 (ref 0.7–3.1)
LYMPHOCYTES NFR BLD AUTO: 45.4 % (ref 19.6–45.3)
MCH RBC QN AUTO: 23.8 PG (ref 26.6–33)
MCHC RBC AUTO-ENTMCNC: 29.4 G/DL (ref 31.5–35.7)
MCV RBC AUTO: 80.7 FL (ref 79–97)
MONOCYTES # BLD AUTO: 0.3 10*3/MM3 (ref 0.1–0.9)
MONOCYTES NFR BLD AUTO: 6.6 % (ref 5–12)
NEUTROPHILS # BLD AUTO: 2.04 10*3/MM3 (ref 1.4–7)
NEUTROPHILS NFR BLD AUTO: 45.2 % (ref 42.7–76)
NRBC BLD AUTO-RTO: 0 /100 WBC (ref 0–0)
PLATELET # BLD AUTO: 328 10*3/MM3 (ref 140–450)
PMV BLD AUTO: 10.4 FL (ref 6–12)
POTASSIUM BLD-SCNC: 3.5 MMOL/L (ref 3.5–5.2)
PROT SERPL-MCNC: 8 G/DL (ref 6–8.5)
RBC # BLD AUTO: 4.04 10*6/MM3 (ref 3.77–5.28)
SODIUM BLD-SCNC: 139 MMOL/L (ref 136–145)
TIBC SERPL-MCNC: 444 MCG/DL (ref 298–536)
TRANSFERRIN SERPL-MCNC: 298 MG/DL (ref 200–360)
TSH SERPL DL<=0.05 MIU/L-ACNC: 2.46 MIU/ML (ref 0.27–4.2)
VIT B12 BLD-MCNC: 576 PG/ML (ref 211–946)
WBC NRBC COR # BLD: 4.52 10*3/MM3 (ref 3.4–10.8)

## 2019-03-29 PROCEDURE — 99214 OFFICE O/P EST MOD 30 MIN: CPT | Performed by: INTERNAL MEDICINE

## 2019-03-29 PROCEDURE — 84432 ASSAY OF THYROGLOBULIN: CPT | Performed by: INTERNAL MEDICINE

## 2019-03-29 PROCEDURE — 84443 ASSAY THYROID STIM HORMONE: CPT | Performed by: INTERNAL MEDICINE

## 2019-03-29 PROCEDURE — 80053 COMPREHEN METABOLIC PANEL: CPT | Performed by: INTERNAL MEDICINE

## 2019-03-29 PROCEDURE — 36415 COLL VENOUS BLD VENIPUNCTURE: CPT | Performed by: INTERNAL MEDICINE

## 2019-03-29 PROCEDURE — 82525 ASSAY OF COPPER: CPT | Performed by: INTERNAL MEDICINE

## 2019-03-29 PROCEDURE — 82607 VITAMIN B-12: CPT | Performed by: INTERNAL MEDICINE

## 2019-03-29 PROCEDURE — 82306 VITAMIN D 25 HYDROXY: CPT | Performed by: INTERNAL MEDICINE

## 2019-03-29 PROCEDURE — 84466 ASSAY OF TRANSFERRIN: CPT | Performed by: INTERNAL MEDICINE

## 2019-03-29 PROCEDURE — 86800 THYROGLOBULIN ANTIBODY: CPT | Performed by: INTERNAL MEDICINE

## 2019-03-29 PROCEDURE — 83540 ASSAY OF IRON: CPT | Performed by: INTERNAL MEDICINE

## 2019-03-29 PROCEDURE — 85025 COMPLETE CBC W/AUTO DIFF WBC: CPT | Performed by: INTERNAL MEDICINE

## 2019-03-29 NOTE — PROGRESS NOTES
Subjective    Vickie Whitten is a 38 y.o. female. she is here today for follow-up.    Hypothyroidism   Pertinent negatives include no abdominal pain, arthralgias, chest pain, coughing, diaphoresis, fatigue, headaches, joint swelling, myalgias, nausea, neck pain, numbness, rash, sore throat, vomiting or weakness.          Thyroid Cancer, Postoperative Hypothyroidism     Duration Thyroid Ca Dx in 12-16     Timing - Cancer is cured, now hypothyroid     Quality -  Right 2.5 cm Follicular Ca w capsular invasion but no lymphatic or vascular invasion. Left lobe w pathology for Hashimoto's      Severity -  mild     Complications - none     Current symptoms/problems  none      Alleviating Factors: Compliance with Brand Name synthroid   Side Effects  none            Evaluation history:          EXAMINATION:   US HEAD NECK SOFT TISSUE-  3/15/2018 9:51 AM CDT     HISTORY: Status post thyroidectomy     Images are obtained transverse longitudinal direction usual manner.  Right lobe of thyroid gland is surgically absent. Residual tissue is  noted in the left thyroid bed. There is a 6 x 7 x 32 mm residual  fragment of thyroid tissue.     This has uniform sonographic appearance color flow is present.     IMPRESSION 6 x 7 x 32 mm residual tissue in the left neck.  This report was finalized on 03/15/2018 09:52 by Dr. Shashank Alba MD.        History:  37-year-old evaluated for thyroid cancer.     Reference:  Thyroid whole body scan March 1, 2018.     TECHNIQUE:  103 mCi I-131 was administered orally. Following a 48 hour delay  anterior and posterior whole-body images acquired.     FINDINGS:  Thyroid bed activity is noted indicating residual thyroid. Normal  gastrointestinal activity and urinary bladder activity is present. No  abnormal foci of radiotracer to suggest distant metastatic disease.        IMPRESSION:  No evidence of distant metastatic disease.  This report was finalized on 04/19/2018 15:35 by  Soham Buckley, .          TSH    Date Value Ref Range Status   03/29/2019 2.460 0.270 - 4.200 mIU/mL Final     Free T4   Date Value Ref Range Status   05/21/2018 0.80 0.78 - 2.19 ng/dL Final       Current medications:  Current Outpatient Medications   Medication Sig Dispense Refill   • Prenatal Multivit-Min-Fe-FA (PRENATAL 1 + IRON PO) Take  by mouth.     • SYNTHROID 125 MCG tablet TAKE ONE TABLET BY MOUTH DAILY 90 tablet 0     No current facility-administered medications for this visit.        The following portions of the patient's history were reviewed and updated as appropriate:   Past Medical History:   Diagnosis Date   • History of thyroid cancer 1/2/2018   • Postoperative hypothyroidism 1/2/2018   • Vitamin D deficiency 1/2/2018     No past surgical history on file.  Family History   Problem Relation Age of Onset   • Thyroid cancer Neg Hx      OB History     No data available        No Known Allergies  Social History     Socioeconomic History   • Marital status:      Spouse name: Not on file   • Number of children: Not on file   • Years of education: Not on file   • Highest education level: Not on file   Tobacco Use   • Smoking status: Never Smoker   • Smokeless tobacco: Never Used       Review of Systems  Review of Systems   Constitutional: Negative for activity change, appetite change, diaphoresis and fatigue.   HENT: Negative for facial swelling, sneezing, sore throat, tinnitus, trouble swallowing and voice change.    Eyes: Negative for photophobia, pain, discharge, redness, itching and visual disturbance.   Respiratory: Negative for apnea, cough, choking, chest tightness and shortness of breath.    Cardiovascular: Negative for chest pain, palpitations and leg swelling.   Gastrointestinal: Negative for abdominal distention, abdominal pain, constipation, diarrhea, nausea and vomiting.   Endocrine: Negative for cold intolerance, heat intolerance, polydipsia, polyphagia and polyuria.   Genitourinary: Negative for difficulty  "urinating, dysuria, frequency, hematuria and urgency.   Musculoskeletal: Negative for arthralgias, back pain, gait problem, joint swelling, myalgias, neck pain and neck stiffness.   Skin: Negative for color change, pallor, rash and wound.   Neurological: Negative for dizziness, tremors, weakness, light-headedness, numbness and headaches.   Hematological: Negative for adenopathy. Does not bruise/bleed easily.   Psychiatric/Behavioral: Negative for behavioral problems, confusion and sleep disturbance.        Objective    /76   Pulse 71   Ht 162.6 cm (64\")   Wt 108 kg (237 lb)   SpO2 98%   BMI 40.68 kg/m²   Physical Exam   Constitutional: She is oriented to person, place, and time. She appears well-developed and well-nourished. No distress.   HENT:   Head: Normocephalic and atraumatic.   Right Ear: External ear normal.   Left Ear: External ear normal.   Nose: Nose normal.   Eyes: Conjunctivae and EOM are normal. Pupils are equal, round, and reactive to light.   Neck: Normal range of motion. Neck supple. No tracheal deviation present.   Thyroid surgically absent   Cardiovascular: Normal rate, regular rhythm and normal heart sounds.   No murmur heard.  Pulmonary/Chest: Effort normal and breath sounds normal. No respiratory distress. She has no wheezes.   Abdominal: Soft. Bowel sounds are normal. There is no tenderness. There is no rebound and no guarding.   Musculoskeletal: Normal range of motion. She exhibits no edema, tenderness or deformity.   Neurological: She is alert and oriented to person, place, and time. No cranial nerve deficit.   Skin: Skin is warm and dry. No rash noted.   Psychiatric: She has a normal mood and affect. Her behavior is normal. Judgment and thought content normal.       Lab Review  Lab Results   Component Value Date    TSH 2.460 03/29/2019     Lab Results   Component Value Date    FREET4 0.80 05/21/2018        Assessment/Plan      1. Postoperative hypothyroidism    2. History of " thyroid cancer    3. Vitamin D deficiency    4. Other iron deficiency anemia    . This diagnosis was discussed and reviewed with the patient including the advantages of drug therapy.     1. Orders placed during this encounter include:  Orders Placed This Encounter   Procedures   • US Thyroid     Scheduling Instructions:      Wendie Rivera     Order Specific Question:   Reason for Exam:     Answer:   h o thyroid cancer   • TSH   • Thyroglobulin With Anti-TG   • Comprehensive Metabolic Panel   • Vitamin D 25 Hydroxy   • Vitamin B12   • Iron Profile   • Copper, Free   • CBC Auto Differential   • Thyroglobulin By DAE     Standing Status:   Future     Number of Occurrences:   1     Standing Expiration Date:   4/7/2020   • CBC & Differential     Order Specific Question:   Manual Differential     Answer:   No       Medications prescribed:  Outpatient Encounter Medications as of 3/29/2019   Medication Sig Dispense Refill   • Prenatal Multivit-Min-Fe-FA (PRENATAL 1 + IRON PO) Take  by mouth.     • SYNTHROID 125 MCG tablet TAKE ONE TABLET BY MOUTH DAILY 90 tablet 0   • [DISCONTINUED] cholecalciferol (VITAMIN D3) 400 units tablet Take 400 Units by mouth Daily.     • [DISCONTINUED] ferrous sulfate 325 (65 FE) MG tablet 1 tab po bid 60 tablet 5     No facility-administered encounter medications on file as of 3/29/2019.      Hypothyroidism postsurgical    History of thyroid cancer     Presently on Synthroid 125     No results found for: THYROGLOBULN    No results found for: THYROGLB    Lab Results   Component Value Date    TGRIA <2.0 03/29/2019    TGRIA 3.2 05/21/2018    TGRIA <2.0 01/02/2018       Lab Results   Component Value Date    THGAB 14.5 (H) 03/29/2019    THGAB 138.3 (H) 05/21/2018    THGAB 40.4 (H) 01/02/2018       Lab Results   Component Value Date    TSH 2.460 03/29/2019    TSH 0.217 (L) 07/30/2018    TSH 13.500 (H) 05/21/2018       Lab Results   Component Value Date    FREET4 0.80 05/21/2018    FREET4 1.09  01/02/2018             Follows w  Dr. Winston     April 17, 2018 received 102.9 MCI I -131    Low iron , on prenatal, taking apart from synthroid     I will refill synthroid through [facility] 125 mcgs daily    --    Low iron persists   She can add ferrous sulfate 325 mg more per day but take later in the day     Nl copper  --    Low vitamin D and low calcium  Suggest 50 th u of vitamin D twice monthly and calcium citrate 400 mg with supper    Repeat labs before next appt           4. Return in about 6 months (around 9/29/2019).

## 2019-04-04 ENCOUNTER — HOSPITAL ENCOUNTER (OUTPATIENT)
Dept: ULTRASOUND IMAGING | Facility: HOSPITAL | Age: 39
Discharge: HOME | End: 2019-04-04
Admitting: INTERNAL MEDICINE

## 2019-04-04 PROCEDURE — 76536 US EXAM OF HEAD AND NECK: CPT

## 2019-04-05 LAB — COPPER, FREE: 590 MCG/L

## 2019-04-07 LAB
THYROGLOB AB SERPL-ACNC: 14.5 IU/ML (ref 0–0.9)
THYROGLOBULIN (TG-RIA): <2 NG/ML

## 2019-04-10 RX ORDER — LEVOTHYROXINE SODIUM 125 MCG
125 TABLET ORAL DAILY
Qty: 90 TABLET | Refills: 3 | Status: SHIPPED | OUTPATIENT
Start: 2019-04-10 | End: 2019-09-30 | Stop reason: SDUPTHER

## 2019-09-30 ENCOUNTER — OFFICE VISIT (OUTPATIENT)
Dept: ENDOCRINOLOGY | Facility: CLINIC | Age: 39
End: 2019-09-30

## 2019-09-30 ENCOUNTER — LAB (OUTPATIENT)
Dept: LAB | Facility: HOSPITAL | Age: 39
End: 2019-09-30

## 2019-09-30 VITALS
HEART RATE: 77 BPM | WEIGHT: 232 LBS | DIASTOLIC BLOOD PRESSURE: 72 MMHG | BODY MASS INDEX: 39.61 KG/M2 | SYSTOLIC BLOOD PRESSURE: 114 MMHG | HEIGHT: 64 IN | OXYGEN SATURATION: 99 %

## 2019-09-30 DIAGNOSIS — Z85.850 HISTORY OF THYROID CANCER: ICD-10-CM

## 2019-09-30 DIAGNOSIS — E89.0 POSTOPERATIVE HYPOTHYROIDISM: Primary | ICD-10-CM

## 2019-09-30 DIAGNOSIS — D50.8 OTHER IRON DEFICIENCY ANEMIA: ICD-10-CM

## 2019-09-30 DIAGNOSIS — E89.0 POSTOPERATIVE HYPOTHYROIDISM: ICD-10-CM

## 2019-09-30 DIAGNOSIS — E55.9 VITAMIN D DEFICIENCY: ICD-10-CM

## 2019-09-30 LAB
25(OH)D3 SERPL-MCNC: 46.5 NG/ML (ref 30–100)
IRON 24H UR-MRATE: 96 MCG/DL (ref 37–145)
IRON SATN MFR SERPL: 21 % (ref 20–50)
T4 FREE SERPL-MCNC: 1.08 NG/DL (ref 0.93–1.7)
TIBC SERPL-MCNC: 463 MCG/DL (ref 298–536)
TRANSFERRIN SERPL-MCNC: 311 MG/DL (ref 200–360)
TSH SERPL DL<=0.05 MIU/L-ACNC: 7.46 UIU/ML (ref 0.27–4.2)
VIT B12 BLD-MCNC: 431 PG/ML (ref 211–946)

## 2019-09-30 PROCEDURE — 84439 ASSAY OF FREE THYROXINE: CPT

## 2019-09-30 PROCEDURE — 84466 ASSAY OF TRANSFERRIN: CPT

## 2019-09-30 PROCEDURE — 82607 VITAMIN B-12: CPT

## 2019-09-30 PROCEDURE — 84443 ASSAY THYROID STIM HORMONE: CPT

## 2019-09-30 PROCEDURE — 82306 VITAMIN D 25 HYDROXY: CPT

## 2019-09-30 PROCEDURE — 83540 ASSAY OF IRON: CPT

## 2019-09-30 PROCEDURE — 36415 COLL VENOUS BLD VENIPUNCTURE: CPT

## 2019-09-30 PROCEDURE — 86800 THYROGLOBULIN ANTIBODY: CPT

## 2019-09-30 PROCEDURE — 84432 ASSAY OF THYROGLOBULIN: CPT

## 2019-09-30 PROCEDURE — 99214 OFFICE O/P EST MOD 30 MIN: CPT | Performed by: INTERNAL MEDICINE

## 2019-09-30 RX ORDER — LEVOTHYROXINE SODIUM 125 MCG
125 TABLET ORAL DAILY
Qty: 90 TABLET | Refills: 3 | Status: SHIPPED | OUTPATIENT
Start: 2019-09-30 | End: 2019-10-02

## 2019-09-30 RX ORDER — FERROUS SULFATE TAB EC 324 MG (65 MG FE EQUIVALENT) 324 (65 FE) MG
324 TABLET DELAYED RESPONSE ORAL
COMMUNITY

## 2019-09-30 NOTE — PROGRESS NOTES
Subjective    Vickie Whitten is a 39 y.o. female. she is here today for follow-up.    Hypothyroidism   Pertinent negatives include no abdominal pain, arthralgias, chest pain, coughing, diaphoresis, fatigue, headaches, joint swelling, myalgias, nausea, neck pain, numbness, rash, sore throat, vomiting or weakness.          Thyroid Cancer, Postoperative Hypothyroidism     Duration Thyroid Ca Dx in 12-16     Timing - Underwent total thyroidectomy in 12-16     Quality -  Right 2.5 cm Follicular Ca w capsular invasion but no lymphatic or vascular invasion. Left lobe w pathology for Hashimoto's      Severity -  Mild upon presentation      Complications - none     Current symptoms/problems  none      Alleviating Factors: Compliance with Brand Name synthroid     March 2018 - had 0.6 x 0.7 x 3.2 cm left residual thyroid tissue that had uptake on WBS  Underwent YOUNG ablation in April 2018 April 2019 , negative ultrasound    Now detectable Tg but elevated TSH         No results found for: THYROGLOBULN    No results found for: THYROGLB    Lab Results   Component Value Date    TGRIA 2.7 09/30/2019    TGRIA <2.0 03/29/2019    TGRIA 3.2 05/21/2018       Lab Results   Component Value Date    THGAB 9.6 (H) 09/30/2019    THGAB 14.5 (H) 03/29/2019    THGAB 138.3 (H) 05/21/2018       Lab Results   Component Value Date    TSH 7.460 (H) 09/30/2019    TSH 2.460 03/29/2019    TSH 0.217 (L) 07/30/2018       Lab Results   Component Value Date    FREET4 1.08 09/30/2019    FREET4 0.80 05/21/2018         Current medications:  Current Outpatient Medications   Medication Sig Dispense Refill   • calcium carbonate-cholecalciferol 500-400 MG-UNIT tablet tablet Take  by mouth Daily.     • Cholecalciferol 41975 units tablet 50 thousand units po q 2 weeks 2 tablet 11   • ferrous sulfate 324 (65 Fe) MG tablet delayed-release EC tablet Take 324 mg by mouth Daily With Breakfast.     • Multiple Vitamins-Minerals (HAIR SKIN AND NAILS FORMULA PO) Take  by  mouth.     • Prenatal Multivit-Min-Fe-FA (PRENATAL 1 + IRON PO) Take  by mouth.     • SYNTHROID 150 MCG tablet Take 1 tablet by mouth Daily. 30 tablet 11     No current facility-administered medications for this visit.        The following portions of the patient's history were reviewed and updated as appropriate:   Past Medical History:   Diagnosis Date   • History of thyroid cancer 1/2/2018   • Postoperative hypothyroidism 1/2/2018   • Vitamin D deficiency 1/2/2018     No past surgical history on file.  Family History   Problem Relation Age of Onset   • Thyroid cancer Neg Hx      OB History     No data available        No Known Allergies  Social History     Socioeconomic History   • Marital status:      Spouse name: Not on file   • Number of children: Not on file   • Years of education: Not on file   • Highest education level: Not on file   Tobacco Use   • Smoking status: Never Smoker   • Smokeless tobacco: Never Used       Review of Systems  Review of Systems   Constitutional: Negative for activity change, appetite change, diaphoresis and fatigue.   HENT: Negative for facial swelling, sneezing, sore throat, tinnitus, trouble swallowing and voice change.    Eyes: Negative for photophobia, pain, discharge, redness, itching and visual disturbance.   Respiratory: Negative for apnea, cough, choking, chest tightness and shortness of breath.    Cardiovascular: Negative for chest pain, palpitations and leg swelling.   Gastrointestinal: Negative for abdominal distention, abdominal pain, constipation, diarrhea, nausea and vomiting.   Endocrine: Negative for cold intolerance, heat intolerance, polydipsia, polyphagia and polyuria.   Genitourinary: Negative for difficulty urinating, dysuria, frequency, hematuria and urgency.   Musculoskeletal: Negative for arthralgias, back pain, gait problem, joint swelling, myalgias, neck pain and neck stiffness.   Skin: Negative for color change, pallor, rash and wound.  "  Neurological: Negative for dizziness, tremors, weakness, light-headedness, numbness and headaches.   Hematological: Negative for adenopathy. Does not bruise/bleed easily.   Psychiatric/Behavioral: Negative for behavioral problems, confusion and sleep disturbance.        Objective    /72   Pulse 77   Ht 162.6 cm (64\")   Wt 105 kg (232 lb)   SpO2 99%   BMI 39.82 kg/m²   Physical Exam   Constitutional: She is oriented to person, place, and time. She appears well-developed and well-nourished. No distress.   HENT:   Head: Normocephalic and atraumatic.   Right Ear: External ear normal.   Left Ear: External ear normal.   Nose: Nose normal.   Eyes: Conjunctivae and EOM are normal. Pupils are equal, round, and reactive to light.   Neck: Normal range of motion. Neck supple. No tracheal deviation present.   Thyroid surgically absent   Cardiovascular: Normal rate, regular rhythm and normal heart sounds.   No murmur heard.  Pulmonary/Chest: Effort normal and breath sounds normal. No respiratory distress. She has no wheezes.   Abdominal: Soft. Bowel sounds are normal. There is no tenderness. There is no rebound and no guarding.   Musculoskeletal: Normal range of motion. She exhibits no edema, tenderness or deformity.   Neurological: She is alert and oriented to person, place, and time. No cranial nerve deficit.   Skin: Skin is warm and dry. No rash noted.   Psychiatric: She has a normal mood and affect. Her behavior is normal. Judgment and thought content normal.       Lab Review  Lab Results   Component Value Date    TSH 7.460 (H) 09/30/2019     Lab Results   Component Value Date    FREET4 1.08 09/30/2019        Assessment/Plan      1. Postoperative hypothyroidism    2. History of thyroid cancer    3. Vitamin D deficiency    4. Other iron deficiency anemia    . This diagnosis was discussed and reviewed with the patient including the advantages of drug therapy.     1. Orders placed during this encounter " include:  Orders Placed This Encounter   Procedures   • Iron Profile     Standing Status:   Standing     Number of Occurrences:   2     Standing Expiration Date:   9/30/2020   • Vitamin B12     Standing Status:   Standing     Number of Occurrences:   2     Standing Expiration Date:   9/30/2020   • Vitamin D 25 Hydroxy     Standing Status:   Standing     Number of Occurrences:   2     Standing Expiration Date:   9/30/2020   • Thyroglobulin With Anti-TG     Standing Status:   Standing     Number of Occurrences:   2     Standing Expiration Date:   9/30/2020   • T4, Free     Standing Status:   Standing     Number of Occurrences:   2     Standing Expiration Date:   9/30/2020   • TSH     Standing Status:   Standing     Number of Occurrences:   2     Standing Expiration Date:   9/30/2020       Medications prescribed:  Outpatient Encounter Medications as of 9/30/2019   Medication Sig Dispense Refill   • calcium carbonate-cholecalciferol 500-400 MG-UNIT tablet tablet Take  by mouth Daily.     • Cholecalciferol 40360 units tablet 50 thousand units po q 2 weeks 2 tablet 11   • ferrous sulfate 324 (65 Fe) MG tablet delayed-release EC tablet Take 324 mg by mouth Daily With Breakfast.     • Multiple Vitamins-Minerals (HAIR SKIN AND NAILS FORMULA PO) Take  by mouth.     • Prenatal Multivit-Min-Fe-FA (PRENATAL 1 + IRON PO) Take  by mouth.     • [DISCONTINUED] SYNTHROID 125 MCG tablet Take 1 tablet by mouth Daily. Brand name only 90 tablet 3   • [DISCONTINUED] SYNTHROID 125 MCG tablet Take 1 tablet by mouth Daily. Brand name only 90 tablet 3     No facility-administered encounter medications on file as of 9/30/2019.      Hypothyroidism postsurgical    History of thyroid cancer , low risk     Tot thyroidectomy in 2016    March / April 2018 , left 3.2 cm residual thyroid tissue with uptake on scan    April 2018 - 100 MCI of YOUNG ablation    April 2019 - neg ultrasound    Now elevated Tg with elevated TSH ( indeterminate / incomplete )  response    Suppress TSH to less than 0.1    If Tg keeps being detectable , I will repeat thyroid ultrasound and whole body scan          No results found for: THYROGLOBULN    No results found for: THYROGLB    Lab Results   Component Value Date    TGRIA 2.7 09/30/2019    TGRIA <2.0 03/29/2019    TGRIA 3.2 05/21/2018       Lab Results   Component Value Date    THGAB 9.6 (H) 09/30/2019    THGAB 14.5 (H) 03/29/2019    THGAB 138.3 (H) 05/21/2018       Lab Results   Component Value Date    TSH 7.460 (H) 09/30/2019    TSH 2.460 03/29/2019    TSH 0.217 (L) 07/30/2018       Lab Results   Component Value Date    FREET4 1.08 09/30/2019    FREET4 0.80 05/21/2018          Low iron , on prenatal, taking apart from synthroid          --    Lab Results   Component Value Date    IRON 96 09/30/2019    TIBC 463 09/30/2019       Low iron persists   She can add ferrous sulfate 325 mg more per day but take later in the day     Nl copper  --    Lab Results   Component Value Date    LFZV02GI 46.5 09/30/2019    OJTL13SJ 27.7 (L) 03/29/2019           Low vitamin D and low calcium  Suggest 50 th u of vitamin D twice monthly and calcium citrate 400 mg with supper    Repeat labs before next appt           4. Return in about 6 months (around 3/30/2020).

## 2019-10-02 DIAGNOSIS — E89.0 POSTOPERATIVE HYPOTHYROIDISM: Primary | ICD-10-CM

## 2019-10-02 DIAGNOSIS — Z85.850 HISTORY OF THYROID CANCER: ICD-10-CM

## 2019-10-02 RX ORDER — LEVOTHYROXINE SODIUM 150 MCG
150 TABLET ORAL DAILY
Qty: 30 TABLET | Refills: 11 | Status: SHIPPED | OUTPATIENT
Start: 2019-10-02 | End: 2020-06-08 | Stop reason: SDUPTHER

## 2019-10-06 LAB
THYROGLOB AB SERPL-ACNC: 9.6 IU/ML (ref 0–0.9)
THYROGLOBULIN (TG-RIA): 2.7 NG/ML

## 2020-05-04 ENCOUNTER — HOSPITAL ENCOUNTER (OUTPATIENT)
Dept: ULTRASOUND IMAGING | Facility: HOSPITAL | Age: 40
Discharge: HOME | End: 2020-05-04
Admitting: INTERNAL MEDICINE

## 2020-05-04 DIAGNOSIS — E89.0 POSTOPERATIVE HYPOTHYROIDISM: ICD-10-CM

## 2020-05-04 DIAGNOSIS — Z85.850 HISTORY OF THYROID CANCER: ICD-10-CM

## 2020-05-04 PROCEDURE — 76536 US EXAM OF HEAD AND NECK: CPT

## 2020-06-08 ENCOUNTER — TELEMEDICINE (OUTPATIENT)
Dept: ENDOCRINOLOGY | Facility: CLINIC | Age: 40
End: 2020-06-08

## 2020-06-08 DIAGNOSIS — D50.8 OTHER IRON DEFICIENCY ANEMIA: ICD-10-CM

## 2020-06-08 DIAGNOSIS — E89.0 POSTOPERATIVE HYPOTHYROIDISM: Primary | ICD-10-CM

## 2020-06-08 DIAGNOSIS — E55.9 VITAMIN D DEFICIENCY: ICD-10-CM

## 2020-06-08 DIAGNOSIS — Z85.850 HISTORY OF THYROID CANCER: ICD-10-CM

## 2020-06-08 PROCEDURE — 99213 OFFICE O/P EST LOW 20 MIN: CPT | Performed by: INTERNAL MEDICINE

## 2020-06-08 RX ORDER — LEVOTHYROXINE SODIUM 150 MCG
150 TABLET ORAL DAILY
Qty: 90 TABLET | Refills: 3 | Status: SHIPPED | OUTPATIENT
Start: 2020-06-08 | End: 2020-12-08 | Stop reason: SDUPTHER

## 2020-06-08 NOTE — PROGRESS NOTES
Subjective    Vickie Whitten is a 39 y.o. female. she is here today for follow-up.                                      This was a Telehealth Encounter. Benefits and Disadvantages of a Telehealth Visit were discussed and accepted by patient. .  Patient agreed to receive service through Telehealth visit as patient is being compliant with social distancing recommendations imparted by CDC.     You have chosen to receive care through a telehealth visit.  Do you consent to use a video/audio connection for your medical care today? Yes        Hypothyroidism   Pertinent negatives include no abdominal pain, arthralgias, chest pain, coughing, diaphoresis, fatigue, headaches, joint swelling, myalgias, nausea, neck pain, numbness, rash, sore throat, vomiting or weakness.          Thyroid Cancer, Postoperative Hypothyroidism     Duration Thyroid Ca Dx in 12-16     Timing - Underwent total thyroidectomy in 12-16     Quality -  Right 2.5 cm Follicular Ca w capsular invasion but no lymphatic or vascular invasion. Left lobe w pathology for Hashimoto's      Severity -  Mild upon presentation      Complications - none     Current symptoms/problems  none      Alleviating Factors: Compliance with Brand Name synthroid     March 2018 - had 0.6 x 0.7 x 3.2 cm left residual thyroid tissue that had uptake on WBS  Underwent YOUNG ablation in April 2018 April 2019 and May 2020  , negative ultrasound    Now detectable Tg but elevated TSH         No results found for: THYROGLOBULN    No results found for: THYROGLB    Lab Results   Component Value Date    TGRIA 2.7 09/30/2019    TGRIA <2.0 03/29/2019    TGRIA 3.2 05/21/2018       Lab Results   Component Value Date    THGAB 9.6 (H) 09/30/2019    THGAB 14.5 (H) 03/29/2019    THGAB 138.3 (H) 05/21/2018       Lab Results   Component Value Date    TSH 7.460 (H) 09/30/2019    TSH 2.460 03/29/2019    TSH 0.217 (L) 07/30/2018       Lab Results   Component Value Date    FREET4 1.08 09/30/2019    FREET4  0.80 05/21/2018         Current medications:  Current Outpatient Medications   Medication Sig Dispense Refill   • calcium carbonate-cholecalciferol 500-400 MG-UNIT tablet tablet Take  by mouth Daily.     • Cholecalciferol 78806 units tablet 50 thousand units po q 2 weeks 2 tablet 11   • ferrous sulfate 324 (65 Fe) MG tablet delayed-release EC tablet Take 324 mg by mouth Daily With Breakfast.     • Multiple Vitamins-Minerals (HAIR SKIN AND NAILS FORMULA PO) Take  by mouth.     • Prenatal Multivit-Min-Fe-FA (PRENATAL 1 + IRON PO) Take  by mouth.     • SYNTHROID 150 MCG tablet Take 1 tablet by mouth Daily. 30 tablet 11     No current facility-administered medications for this visit.        The following portions of the patient's history were reviewed and updated as appropriate:   Past Medical History:   Diagnosis Date   • History of thyroid cancer 1/2/2018   • Postoperative hypothyroidism 1/2/2018   • Vitamin D deficiency 1/2/2018     No past surgical history on file.  Family History   Problem Relation Age of Onset   • Thyroid cancer Neg Hx      OB History    None       No Known Allergies  Social History     Socioeconomic History   • Marital status:      Spouse name: Not on file   • Number of children: Not on file   • Years of education: Not on file   • Highest education level: Not on file   Tobacco Use   • Smoking status: Never Smoker   • Smokeless tobacco: Never Used       Review of Systems  Review of Systems   Constitutional: Negative for activity change, appetite change, diaphoresis and fatigue.   HENT: Negative for facial swelling, sneezing, sore throat, tinnitus, trouble swallowing and voice change.    Eyes: Negative for photophobia, pain, discharge, redness, itching and visual disturbance.   Respiratory: Negative for apnea, cough, choking, chest tightness and shortness of breath.    Cardiovascular: Negative for chest pain, palpitations and leg swelling.   Gastrointestinal: Negative for abdominal  distention, abdominal pain, constipation, diarrhea, nausea and vomiting.   Endocrine: Negative for cold intolerance, heat intolerance, polydipsia, polyphagia and polyuria.   Genitourinary: Negative for difficulty urinating, dysuria, frequency, hematuria and urgency.   Musculoskeletal: Negative for arthralgias, back pain, gait problem, joint swelling, myalgias, neck pain and neck stiffness.   Skin: Negative for color change, pallor, rash and wound.   Neurological: Negative for dizziness, tremors, weakness, light-headedness, numbness and headaches.   Hematological: Negative for adenopathy. Does not bruise/bleed easily.   Psychiatric/Behavioral: Negative for behavioral problems, confusion and sleep disturbance.        Objective    There were no vitals taken for this visit.       Physical Exam   Constitutional: She appears well-developed and well-nourished. No distress.   HENT:   Head: Normocephalic.   Right Ear: External ear normal.   Left Ear: External ear normal.   Nose: Nose normal.   Mouth/Throat: Oropharynx is clear and moist. No oropharyngeal exudate.   Eyes: Pupils are equal, round, and reactive to light. Conjunctivae and EOM are normal. Right eye exhibits no discharge. Left eye exhibits no discharge. No scleral icterus.   Neck: Neck normal appearance.No JVD present. No tracheal deviation present. No thyromegaly present.   Cardiovascular:   No conjunctival pallor noted.    Pulmonary/Chest: Effort normal. No stridor.  No respiratory distress. She no audible wheeze...She exhibits no tenderness.   Abdominal: Abdomen appears normal. Soft. She exhibits no distension and no visible mass. There is no tenderness. No visible hernia present.   Musculoskeletal: Normal range of motion.         General: No tenderness, deformity, edema or no effusion.   Lymphadenopathy:     She has no cervical adenopathy.   Neurological: She is alert. No cranial nerve deficit. Coordination normal.   Skin: No rash noted. She is not  diaphoretic. No nail bed cyanosis or erythema. No pallor. Nails show no clubbing.   Psychiatric: She mood appears normal. Her affect is normal. Her behavior is normal. Thought content is normal. She does not express abnormal judgement.         Lab Review  Lab Results   Component Value Date    TSH 7.460 (H) 09/30/2019     Lab Results   Component Value Date    FREET4 1.08 09/30/2019        Assessment/Plan      1. Postoperative hypothyroidism    2. History of thyroid cancer    3. Vitamin D deficiency    4. Other iron deficiency anemia    . This diagnosis was discussed and reviewed with the patient including the advantages of drug therapy.     1. Orders placed during this encounter include:  No orders of the defined types were placed in this encounter.      Medications prescribed:  Outpatient Encounter Medications as of 6/8/2020   Medication Sig Dispense Refill   • calcium carbonate-cholecalciferol 500-400 MG-UNIT tablet tablet Take  by mouth Daily.     • Cholecalciferol 11769 units tablet 50 thousand units po q 2 weeks 2 tablet 11   • ferrous sulfate 324 (65 Fe) MG tablet delayed-release EC tablet Take 324 mg by mouth Daily With Breakfast.     • Multiple Vitamins-Minerals (HAIR SKIN AND NAILS FORMULA PO) Take  by mouth.     • Prenatal Multivit-Min-Fe-FA (PRENATAL 1 + IRON PO) Take  by mouth.     • SYNTHROID 150 MCG tablet Take 1 tablet by mouth Daily. 30 tablet 11     No facility-administered encounter medications on file as of 6/8/2020.      Hypothyroidism postsurgical    History of thyroid cancer , low risk     Tot thyroidectomy in 2016    March / April 2018 , left 3.2 cm residual thyroid tissue with uptake on scan    April 2018 - 100 MCI of YOUNG ablation    April 2019 and May 2020 - neg ultrasound    Now elevated Tg with elevated TSH ( indeterminate / incomplete ) response    Suppress TSH to less than 0.1    If Tg keeps being detectable , I will repeat thyroid ultrasound and whole body scan          No results found  for: THYROGLOBULN    No results found for: THYROGLB    Lab Results   Component Value Date    TGRIA 2.7 09/30/2019    TGRIA <2.0 03/29/2019    TGRIA 3.2 05/21/2018       Lab Results   Component Value Date    THGAB 9.6 (H) 09/30/2019    THGAB 14.5 (H) 03/29/2019    THGAB 138.3 (H) 05/21/2018       Lab Results   Component Value Date    TSH 7.460 (H) 09/30/2019    TSH 2.460 03/29/2019    TSH 0.217 (L) 07/30/2018       Lab Results   Component Value Date    FREET4 1.08 09/30/2019    FREET4 0.80 05/21/2018          Low iron , on prenatal, taking apart from synthroid          --    Lab Results   Component Value Date    IRON 96 09/30/2019    TIBC 463 09/30/2019       Low iron persists   She can add ferrous sulfate 325 mg more per day but take later in the day     Nl copper  --    Lab Results   Component Value Date    OGVO27XE 46.5 09/30/2019    MZGH06TA 27.7 (L) 03/29/2019           Low vitamin D and low calcium  Suggest 50 th u of vitamin D twice monthly and calcium citrate 400 mg with supper    Repeat labs before next appt           4. No follow-ups on file.    I spent 15 minutes reviewing patient electronic chart , reviewing medications , past history , active problems.   I provided advice regarding management of medical conditions, refilled prescriptions , ordered labs and arranged for future appointment.   Patient was advised to contact us if there were any unanswered questions or ongoing concerns.

## 2020-12-08 ENCOUNTER — TELEMEDICINE (OUTPATIENT)
Dept: ENDOCRINOLOGY | Facility: CLINIC | Age: 40
End: 2020-12-08

## 2020-12-08 DIAGNOSIS — E89.0 POSTOPERATIVE HYPOTHYROIDISM: ICD-10-CM

## 2020-12-08 DIAGNOSIS — Z85.850 HISTORY OF THYROID CANCER: ICD-10-CM

## 2020-12-08 DIAGNOSIS — D50.8 OTHER IRON DEFICIENCY ANEMIA: Primary | ICD-10-CM

## 2020-12-08 DIAGNOSIS — E55.9 VITAMIN D DEFICIENCY: ICD-10-CM

## 2020-12-08 PROCEDURE — 99214 OFFICE O/P EST MOD 30 MIN: CPT | Performed by: INTERNAL MEDICINE

## 2020-12-08 RX ORDER — LEVOTHYROXINE SODIUM 150 MCG
150 TABLET ORAL DAILY
Qty: 90 TABLET | Refills: 3 | Status: SHIPPED | OUTPATIENT
Start: 2020-12-08 | End: 2021-12-08

## 2020-12-08 NOTE — PROGRESS NOTES
Subjective    Vickie Whitten is a 40 y.o. female. she is here today for follow-up.                                      This was a Telehealth Encounter. Benefits and Disadvantages of a Telehealth Visit were discussed and accepted by patient. .  Patient agreed to receive service through Telehealth visit as patient is being compliant with social distancing recommendations imparted by CDC.     You have chosen to receive care through a telehealth visit.  Do you consent to use a video/audio connection for your medical care today? Yes        Hypothyroidism  Pertinent negatives include no abdominal pain, arthralgias, chest pain, coughing, diaphoresis, fatigue, headaches, joint swelling, myalgias, nausea, neck pain, numbness, rash, sore throat, vomiting or weakness.          Thyroid Cancer, Postoperative Hypothyroidism     Duration Thyroid Ca Dx in 12-16     Timing - Underwent total thyroidectomy in 12-16     Quality -  Right 2.5 cm Follicular Ca w capsular invasion but no lymphatic or vascular invasion. Left lobe w pathology for Hashimoto's      Severity -  Mild upon presentation      Complications - none     Current symptoms/problems  none      Alleviating Factors: Compliance with Brand Name synthroid     March 2018 - had 0.6 x 0.7 x 3.2 cm left residual thyroid tissue that had uptake on WBS  Underwent YOUNG ablation in April 2018 April 2019 and May 2020  , negative ultrasound    Now detectable Tg but elevated TSH         No results found for: THYROGLOBULN    No results found for: THYROGLB    Lab Results   Component Value Date    TGRIA 2.7 09/30/2019    TGRIA <2.0 03/29/2019    TGRIA 3.2 05/21/2018       Lab Results   Component Value Date    THGAB 9.6 (H) 09/30/2019    THGAB 14.5 (H) 03/29/2019    THGAB 138.3 (H) 05/21/2018       Lab Results   Component Value Date    TSH 7.460 (H) 09/30/2019    TSH 2.460 03/29/2019    TSH 0.217 (L) 07/30/2018       Lab Results   Component Value Date    FREET4 1.08 09/30/2019    FREET4  0.80 05/21/2018         Current medications:  Current Outpatient Medications   Medication Sig Dispense Refill   • calcium carbonate-cholecalciferol 500-400 MG-UNIT tablet tablet Take  by mouth Daily.     • Cholecalciferol 1.25 MG (15327 UT) tablet 50 thousand units po q 2 weeks 6 tablet 11   • ferrous sulfate 324 (65 Fe) MG tablet delayed-release EC tablet Take 324 mg by mouth Daily With Breakfast.     • Synthroid 150 MCG tablet Take 1 tablet by mouth Daily. 90 tablet 3     No current facility-administered medications for this visit.        The following portions of the patient's history were reviewed and updated as appropriate:   Past Medical History:   Diagnosis Date   • History of thyroid cancer 1/2/2018   • Postoperative hypothyroidism 1/2/2018   • Vitamin D deficiency 1/2/2018     No past surgical history on file.  Family History   Problem Relation Age of Onset   • Thyroid cancer Neg Hx      OB History    No obstetric history on file.       No Known Allergies  Social History     Socioeconomic History   • Marital status:      Spouse name: Not on file   • Number of children: Not on file   • Years of education: Not on file   • Highest education level: Not on file   Tobacco Use   • Smoking status: Never Smoker   • Smokeless tobacco: Never Used       Review of Systems  Review of Systems   Constitutional: Negative for activity change, appetite change, diaphoresis and fatigue.   HENT: Negative for facial swelling, sneezing, sore throat, tinnitus, trouble swallowing and voice change.    Eyes: Negative for photophobia, pain, discharge, redness, itching and visual disturbance.   Respiratory: Negative for apnea, cough, choking, chest tightness and shortness of breath.    Cardiovascular: Negative for chest pain, palpitations and leg swelling.   Gastrointestinal: Negative for abdominal distention, abdominal pain, constipation, diarrhea, nausea and vomiting.   Endocrine: Negative for cold intolerance, heat  intolerance, polydipsia, polyphagia and polyuria.   Genitourinary: Negative for difficulty urinating, dysuria, frequency, hematuria and urgency.   Musculoskeletal: Negative for arthralgias, back pain, gait problem, joint swelling, myalgias, neck pain and neck stiffness.   Skin: Negative for color change, pallor, rash and wound.   Neurological: Negative for dizziness, tremors, weakness, light-headedness, numbness and headaches.   Hematological: Negative for adenopathy. Does not bruise/bleed easily.   Psychiatric/Behavioral: Negative for behavioral problems, confusion and sleep disturbance.        Objective    There were no vitals taken for this visit.     Physical Exam   Constitutional: She appears well-developed and well-nourished. No distress.   HENT:   Head: Normocephalic.   Right Ear: External ear normal.   Left Ear: External ear normal.   Nose: Nose normal.   Mouth/Throat: Oropharynx is clear and moist. No oropharyngeal exudate.   Eyes: Pupils are equal, round, and reactive to light. Conjunctivae and EOM are normal. Right eye exhibits no discharge. Left eye exhibits no discharge. No scleral icterus.   Neck: Neck normal appearance.No JVD present. No tracheal deviation present. No thyromegaly present.   Cardiovascular:   No conjunctival pallor noted.    Pulmonary/Chest: Effort normal. No stridor.  No respiratory distress. She no audible wheeze...She exhibits no tenderness.   Abdominal: Abdomen appears normal. Soft. She exhibits no distension and no visible mass. There is no abdominal tenderness. No visible hernia present.   Musculoskeletal: Normal range of motion.         General: No tenderness, deformity, edema or no effusion.   Lymphadenopathy:     She has no cervical adenopathy.   Neurological: She is alert. No cranial nerve deficit. Coordination normal.   Skin: No rash noted. She is not diaphoretic. No nail bed cyanosis or erythema. No pallor. Nails show no clubbing.   Psychiatric: She mood appears normal. Her  affect is normal. Her behavior is normal. Thought content is normal. She does not express abnormal judgement.           Lab Review  Lab Results   Component Value Date    TSH 7.460 (H) 09/30/2019     Lab Results   Component Value Date    FREET4 1.08 09/30/2019        Assessment/Plan      1. Other iron deficiency anemia    2. Postoperative hypothyroidism    3. History of thyroid cancer    4. Vitamin D deficiency    . This diagnosis was discussed and reviewed with the patient including the advantages of drug therapy.     1. Orders placed during this encounter include:  No orders of the defined types were placed in this encounter.      Medications prescribed:  Outpatient Encounter Medications as of 12/8/2020   Medication Sig Dispense Refill   • calcium carbonate-cholecalciferol 500-400 MG-UNIT tablet tablet Take  by mouth Daily.     • Cholecalciferol 1.25 MG (96746 UT) tablet 50 thousand units po q 2 weeks 6 tablet 11   • ferrous sulfate 324 (65 Fe) MG tablet delayed-release EC tablet Take 324 mg by mouth Daily With Breakfast.     • Synthroid 150 MCG tablet Take 1 tablet by mouth Daily. 90 tablet 3   • [DISCONTINUED] Cholecalciferol 1.25 MG (02018 UT) tablet 50 thousand units po q 2 weeks 6 tablet 11   • [DISCONTINUED] Multiple Vitamins-Minerals (HAIR SKIN AND NAILS FORMULA PO) Take  by mouth.     • [DISCONTINUED] Prenatal Multivit-Min-Fe-FA (PRENATAL 1 + IRON PO) Take  by mouth.     • [DISCONTINUED] SYNTHROID 150 MCG tablet Take 1 tablet by mouth Daily. 90 tablet 3     No facility-administered encounter medications on file as of 12/8/2020.      Hypothyroidism postsurgical    History of thyroid cancer , low risk     Tot thyroidectomy in 2016    March / April 2018 , left 3.2 cm residual thyroid tissue with uptake on scan    April 2018 - 100 MCI of YOUNG ablation    April 2019 and May 2020 - neg ultrasound    Now elevated Tg with elevated TSH ( indeterminate / incomplete ) response    Suppress TSH to less than 0.1    If Tg  keeps being detectable , I will repeat thyroid ultrasound and whole body scan       I have old labs, needs new      No results found for: THYROGLOBULN    No results found for: THYROGLB    Lab Results   Component Value Date    TGRIA 2.7 09/30/2019    TGRIA <2.0 03/29/2019    TGRIA 3.2 05/21/2018       Lab Results   Component Value Date    THGAB 9.6 (H) 09/30/2019    THGAB 14.5 (H) 03/29/2019    THGAB 138.3 (H) 05/21/2018       Lab Results   Component Value Date    TSH 7.460 (H) 09/30/2019    TSH 2.460 03/29/2019    TSH 0.217 (L) 07/30/2018       Lab Results   Component Value Date    FREET4 1.08 09/30/2019    FREET4 0.80 05/21/2018          Low iron , on prenatal, taking apart from synthroid - no longer          --    Lab Results   Component Value Date    IRON 96 09/30/2019    TIBC 463 09/30/2019       Low iron persists   Doing ferrous sulfate 325 mg more per day but take later in the day     Nl copper  --    Lab Results   Component Value Date    [id number] 46.5 09/30/2019    BQDN24ZR 27.7 (L) 03/29/2019           Low vitamin D and low calcium  Suggest 50 th u of vitamin D twice monthly and calcium citrate 400 mg with supper    Repeat labs before next appt           4. No follow-ups on file.    I spent 15 minutes reviewing patient electronic chart , reviewing medications , past history , active problems.   I provided advice regarding management of medical conditions, refilled prescriptions , ordered labs and arranged for future appointment.   Patient was advised to contact us if there were any unanswered questions or ongoing concerns.

## 2021-06-08 ENCOUNTER — TELEMEDICINE (OUTPATIENT)
Dept: ENDOCRINOLOGY | Facility: CLINIC | Age: 41
End: 2021-06-08

## 2021-06-08 DIAGNOSIS — Z85.850 HISTORY OF THYROID CANCER: ICD-10-CM

## 2021-06-08 DIAGNOSIS — E89.0 POSTOPERATIVE HYPOTHYROIDISM: Primary | ICD-10-CM

## 2021-06-08 DIAGNOSIS — E55.9 VITAMIN D DEFICIENCY: ICD-10-CM

## 2021-06-08 DIAGNOSIS — D50.8 OTHER IRON DEFICIENCY ANEMIA: ICD-10-CM

## 2021-06-08 PROCEDURE — 99214 OFFICE O/P EST MOD 30 MIN: CPT | Performed by: INTERNAL MEDICINE

## 2021-06-08 NOTE — PROGRESS NOTES
Subjective    Vickie Whitten is a 40 y.o. female. she is here today for follow-up.                                      This was a Telehealth Encounter. Benefits and Disadvantages of a Telehealth Visit were discussed and accepted by patient. .  Patient agreed to receive service through Telehealth visit as patient is being compliant with social distancing recommendations imparted by CDC.     You have chosen to receive care through a telehealth visit.  Do you consent to use a video/audio connection for your medical care today? Yes        Hypothyroidism  Pertinent negatives include no abdominal pain, arthralgias, chest pain, coughing, diaphoresis, fatigue, headaches, joint swelling, myalgias, nausea, neck pain, numbness, rash, sore throat, vomiting or weakness.          Thyroid Cancer, Postoperative Hypothyroidism     Duration Thyroid Ca Dx in 12-16     Timing - Underwent total thyroidectomy in 12-16     Quality -  Right 2.5 cm Follicular Ca w capsular invasion but no lymphatic or vascular invasion. Left lobe w pathology for Hashimoto's      Severity -  Mild upon presentation      Complications - none     Current symptoms/problems  strep throat     Alleviating Factors: Compliance with Brand Name synthroid     March 2018 - had 0.6 x 0.7 x 3.2 cm left residual thyroid tissue that had uptake on WBS  Underwent YOUNG ablation in April 2018 April 2019 and May 2020  , negative ultrasound    Tg diana while TSH was high but latest TSH low in March 2021 , Tg ab 3 , tg not measured , was taking biotin ,     PE    There were no vitals taken for this visit.  AOx3  No visible goiter  Normal respiratory effort  No Edema    Labs    Lab Results   Component Value Date    WBC 4.52 03/29/2019    HGB 9.6 (L) 03/29/2019    HCT 32.6 (L) 03/29/2019    MCV 80.7 03/29/2019     03/29/2019     Lab Results   Component Value Date    GLUCOSE 88 03/29/2019    BUN 7 03/29/2019    CREATININE 0.69 03/29/2019    EGFRIFAFRI 117 03/29/2019    BCR  10.1 03/29/2019    K 3.5 03/29/2019    CO2 25.7 03/29/2019    CALCIUM 8.5 (L) 03/29/2019    ALBUMIN 4.10 03/29/2019    AST 28 03/29/2019    ALT 16 03/29/2019               Lab Review  Lab Results   Component Value Date    TSH 7.460 (H) 09/30/2019     Lab Results   Component Value Date    FREET4 1.08 09/30/2019        Assessment/Plan      No diagnosis found.. This diagnosis was discussed and reviewed with the patient including the advantages of drug therapy.     1. Orders placed during this encounter include:  No orders of the defined types were placed in this encounter.      Medications prescribed:  Outpatient Encounter Medications as of 6/8/2021   Medication Sig Dispense Refill   • calcium carbonate-cholecalciferol 500-400 MG-UNIT tablet tablet Take  by mouth Daily.     • Cholecalciferol 1.25 MG (05971 UT) tablet 50 thousand units po q 2 weeks 6 tablet 11   • ferrous sulfate 324 (65 Fe) MG tablet delayed-release EC tablet Take 324 mg by mouth Daily With Breakfast.     • Synthroid 150 MCG tablet Take 1 tablet by mouth Daily. 90 tablet 3     No facility-administered encounter medications on file as of 6/8/2021.     Hypothyroidism postsurgical    History of thyroid cancer , low risk     Tot thyroidectomy in 2016    March / April 2018 , left 3.2 cm residual thyroid tissue with uptake on scan    April 2018 - 100 MCI of YOUNG ablation    April 2019 and May 2020 - neg ultrasound    Now elevated Tg with elevated TSH ( indeterminate / incomplete ) response    Suppress TSH to less than 0.1, this has been achieved.   Need repeat tsh and Tg without MVI     If Tg keeps being detectable , I will repeat thyroid ultrasound and whole body scan     On 150 daily       No results found for: THYROGLOBULN    No results found for: THYROGLB    Lab Results   Component Value Date    TGRIA 2.7 09/30/2019    TGRIA <2.0 03/29/2019    TGRIA 3.2 05/21/2018       Lab Results   Component Value Date    THGAB 9.6 (H) 09/30/2019    THGAB 14.5 (H)  03/29/2019    THGAB 138.3 (H) 05/21/2018       Lab Results   Component Value Date    TSH 7.460 (H) 09/30/2019    TSH 2.460 03/29/2019    TSH 0.217 (L) 07/30/2018       Lab Results   Component Value Date    FREET4 1.08 09/30/2019    FREET4 0.80 05/21/2018          Low iron , on prenatal, taking apart from synthroid - no longer          --    Lab Results   Component Value Date    IRON 96 09/30/2019    TIBC 463 09/30/2019       Low iron persists   Doing ferrous sulfate 325 mg more per day but take later in the day     Nl copper  --    Lab Results   Component Value Date    EBAE76ME 46.5 09/30/2019    OWKD05JM 27.7 (L) 03/29/2019           Low vitamin D and low calcium  Suggest 50 th u of vitamin D twice monthly and calcium citrate 400 mg with supper    Repeat labs before next appt           4. No follow-ups on file.    I spent 15 minutes reviewing patient electronic chart , reviewing medications , past history , active problems.   I provided advice regarding management of medical conditions, refilled prescriptions , ordered labs and arranged for future appointment.   Patient was advised to contact us if there were any unanswered questions or ongoing concerns.

## 2021-12-03 ENCOUNTER — LAB (OUTPATIENT)
Dept: LAB | Facility: HOSPITAL | Age: 41
End: 2021-12-03

## 2021-12-03 LAB
ALBUMIN SERPL-MCNC: 3.9 G/DL (ref 3.5–5.2)
ALBUMIN/GLOB SERPL: 0.9 G/DL
ALP SERPL-CCNC: 97 U/L (ref 39–117)
ALT SERPL W P-5'-P-CCNC: 23 U/L (ref 1–33)
ANION GAP SERPL CALCULATED.3IONS-SCNC: 8.9 MMOL/L (ref 5–15)
AST SERPL-CCNC: 35 U/L (ref 1–32)
BASOPHILS # BLD AUTO: 0.02 10*3/MM3 (ref 0–0.2)
BASOPHILS NFR BLD AUTO: 0.4 % (ref 0–1.5)
BILIRUB SERPL-MCNC: 0.3 MG/DL (ref 0–1.2)
BUN SERPL-MCNC: 9 MG/DL (ref 6–20)
BUN/CREAT SERPL: 11.1 (ref 7–25)
CALCIUM SPEC-SCNC: 9.1 MG/DL (ref 8.6–10.5)
CHLORIDE SERPL-SCNC: 102 MMOL/L (ref 98–107)
CO2 SERPL-SCNC: 26.1 MMOL/L (ref 22–29)
CREAT SERPL-MCNC: 0.81 MG/DL (ref 0.57–1)
DEPRECATED RDW RBC AUTO: 52.7 FL (ref 37–54)
EOSINOPHIL # BLD AUTO: 0.05 10*3/MM3 (ref 0–0.4)
EOSINOPHIL NFR BLD AUTO: 1.1 % (ref 0.3–6.2)
ERYTHROCYTE [DISTWIDTH] IN BLOOD BY AUTOMATED COUNT: 18.8 % (ref 12.3–15.4)
FERRITIN SERPL-MCNC: 20.4 NG/ML (ref 13–150)
GFR SERPL CREATININE-BSD FRML MDRD: 95 ML/MIN/1.73
GLOBULIN UR ELPH-MCNC: 4.2 GM/DL
GLUCOSE SERPL-MCNC: 80 MG/DL (ref 65–99)
HCT VFR BLD AUTO: 33.8 % (ref 34–46.6)
HGB BLD-MCNC: 10 G/DL (ref 12–15.9)
IMM GRANULOCYTES # BLD AUTO: 0.01 10*3/MM3 (ref 0–0.05)
IMM GRANULOCYTES NFR BLD AUTO: 0.2 % (ref 0–0.5)
IRON 24H UR-MRATE: 23 MCG/DL (ref 37–145)
IRON SATN MFR SERPL: 5 % (ref 20–50)
LYMPHOCYTES # BLD AUTO: 2.26 10*3/MM3 (ref 0.7–3.1)
LYMPHOCYTES NFR BLD AUTO: 47.8 % (ref 19.6–45.3)
MCH RBC QN AUTO: 23.3 PG (ref 26.6–33)
MCHC RBC AUTO-ENTMCNC: 29.6 G/DL (ref 31.5–35.7)
MCV RBC AUTO: 78.6 FL (ref 79–97)
MONOCYTES # BLD AUTO: 0.33 10*3/MM3 (ref 0.1–0.9)
MONOCYTES NFR BLD AUTO: 7 % (ref 5–12)
NEUTROPHILS NFR BLD AUTO: 2.06 10*3/MM3 (ref 1.7–7)
NEUTROPHILS NFR BLD AUTO: 43.5 % (ref 42.7–76)
NRBC BLD AUTO-RTO: 0 /100 WBC (ref 0–0.2)
PLATELET # BLD AUTO: 324 10*3/MM3 (ref 140–450)
PMV BLD AUTO: 9.7 FL (ref 6–12)
POTASSIUM SERPL-SCNC: 4.1 MMOL/L (ref 3.5–5.2)
PROT SERPL-MCNC: 8.1 G/DL (ref 6–8.5)
RBC # BLD AUTO: 4.3 10*6/MM3 (ref 3.77–5.28)
SODIUM SERPL-SCNC: 137 MMOL/L (ref 136–145)
TIBC SERPL-MCNC: 462 MCG/DL (ref 298–536)
TRANSFERRIN SERPL-MCNC: 310 MG/DL (ref 200–360)
TSH SERPL DL<=0.05 MIU/L-ACNC: 0.02 UIU/ML (ref 0.27–4.2)
WBC NRBC COR # BLD: 4.73 10*3/MM3 (ref 3.4–10.8)

## 2021-12-03 PROCEDURE — 84443 ASSAY THYROID STIM HORMONE: CPT | Performed by: INTERNAL MEDICINE

## 2021-12-03 PROCEDURE — 86800 THYROGLOBULIN ANTIBODY: CPT | Performed by: INTERNAL MEDICINE

## 2021-12-03 PROCEDURE — 83540 ASSAY OF IRON: CPT | Performed by: INTERNAL MEDICINE

## 2021-12-03 PROCEDURE — 84432 ASSAY OF THYROGLOBULIN: CPT | Performed by: INTERNAL MEDICINE

## 2021-12-03 PROCEDURE — 82728 ASSAY OF FERRITIN: CPT | Performed by: INTERNAL MEDICINE

## 2021-12-03 PROCEDURE — 85025 COMPLETE CBC W/AUTO DIFF WBC: CPT | Performed by: INTERNAL MEDICINE

## 2021-12-03 PROCEDURE — 84466 ASSAY OF TRANSFERRIN: CPT | Performed by: INTERNAL MEDICINE

## 2021-12-03 PROCEDURE — 36415 COLL VENOUS BLD VENIPUNCTURE: CPT | Performed by: INTERNAL MEDICINE

## 2021-12-03 PROCEDURE — 80053 COMPREHEN METABOLIC PANEL: CPT | Performed by: INTERNAL MEDICINE

## 2021-12-08 ENCOUNTER — TELEMEDICINE (OUTPATIENT)
Dept: ENDOCRINOLOGY | Facility: CLINIC | Age: 41
End: 2021-12-08

## 2021-12-08 DIAGNOSIS — E55.9 VITAMIN D DEFICIENCY: ICD-10-CM

## 2021-12-08 DIAGNOSIS — E89.0 POSTOPERATIVE HYPOTHYROIDISM: Primary | ICD-10-CM

## 2021-12-08 DIAGNOSIS — D50.8 OTHER IRON DEFICIENCY ANEMIA: ICD-10-CM

## 2021-12-08 DIAGNOSIS — Z85.850 HISTORY OF THYROID CANCER: ICD-10-CM

## 2021-12-08 PROCEDURE — 99214 OFFICE O/P EST MOD 30 MIN: CPT | Performed by: INTERNAL MEDICINE

## 2021-12-08 RX ORDER — LEVOTHYROXINE SODIUM 0.12 MG/1
125 TABLET ORAL DAILY
Qty: 30 TABLET | Refills: 11 | Status: SHIPPED | OUTPATIENT
Start: 2021-12-08 | End: 2022-04-16

## 2021-12-08 NOTE — PROGRESS NOTES
Subjective    Vickie Whitten is a 41 y.o. female. she is here today for follow-up.                                      This was a Telehealth Encounter. Benefits and Disadvantages of a Telehealth Visit were discussed and accepted by patient. .  Patient agreed to receive service through Telehealth visit as patient is being compliant with social distancing recommendations imparted by CDC.     You have chosen to receive care through a telehealth visit.  Do you consent to use a video/audio connection for your medical care today? Yes        Hypothyroidism  Pertinent negatives include no abdominal pain, arthralgias, chest pain, coughing, diaphoresis, fatigue, headaches, joint swelling, myalgias, nausea, neck pain, numbness, rash, sore throat, vomiting or weakness.          Thyroid Cancer, Postoperative Hypothyroidism     Duration Thyroid Ca Dx in 12-16     Timing - Underwent total thyroidectomy in 12-16     Quality -  Right 2.5 cm Follicular Ca w capsular invasion but no lymphatic or vascular invasion. Left lobe w pathology for Hashimoto's      Severity -  Mild upon presentation      Complications - none     Current symptoms/problems  strep throat     Alleviating Factors: Compliance with Brand Name synthroid     March 2018 - had 0.6 x 0.7 x 3.2 cm left residual thyroid tissue that had uptake on WBS  Underwent YOUNG ablation in April 2018 April 2019 and May 2020  , negative ultrasound    Tg diana while TSH was high but latest TSH low in March 2021 , Tg ab 3 , tg not measured , was taking biotin ,     PE    There were no vitals taken for this visit.  AOx3  No visible goiter  Normal respiratory effort  No Edema    Labs    Lab Results   Component Value Date    WBC 4.73 12/03/2021    HGB 10.0 (L) 12/03/2021    HCT 33.8 (L) 12/03/2021    MCV 78.6 (L) 12/03/2021     12/03/2021     Lab Results   Component Value Date    GLUCOSE 80 12/03/2021    BUN 9 12/03/2021    CREATININE 0.81 12/03/2021    EGFRIFAFRI 95 12/03/2021    BCR  11.1 12/03/2021    K 4.1 12/03/2021    CO2 26.1 12/03/2021    CALCIUM 9.1 12/03/2021    ALBUMIN 3.90 12/03/2021    AST 35 (H) 12/03/2021    ALT 23 12/03/2021               Lab Review  Lab Results   Component Value Date    TSH 0.024 (L) 12/03/2021     Lab Results   Component Value Date    FREET4 1.08 09/30/2019        Assessment/Plan      1. Postoperative hypothyroidism    2. History of thyroid cancer    . This diagnosis was discussed and reviewed with the patient including the advantages of drug therapy.     1. Orders placed during this encounter include:  No orders of the defined types were placed in this encounter.      Medications prescribed:  Outpatient Encounter Medications as of 12/8/2021   Medication Sig Dispense Refill   • Cholecalciferol 1.25 MG (19645 UT) tablet 50 thousand units po q 2 weeks 6 tablet 11   • ferrous sulfate 324 (65 Fe) MG tablet delayed-release EC tablet Take 324 mg by mouth Daily With Breakfast.     • levothyroxine (Synthroid) 125 MCG tablet Take 1 tablet by mouth Daily. Brand name only , LUIS 30 tablet 11   • [DISCONTINUED] calcium carbonate-cholecalciferol 500-400 MG-UNIT tablet tablet Take  by mouth Daily.     • [DISCONTINUED] Synthroid 150 MCG tablet Take 1 tablet by mouth Daily. 90 tablet 3     No facility-administered encounter medications on file as of 12/8/2021.     Hypothyroidism postsurgical    History of thyroid cancer , low risk     Tot thyroidectomy in 2016 March / April 2018 , left 3.2 cm residual thyroid tissue with uptake on scan    April 2018 - 100 MCI of YOUNG ablation    April 2019 and May 2020 - neg ultrasound    Now elevated Tg with elevated TSH ( indeterminate / incomplete ) response    Suppress TSH to less than 0.1, this has been achieved.   Need repeat tsh and Tg without MVI     If Tg keeps being detectable , I will repeat thyroid ultrasound and whole body scan     On 150 daily - decrease to 125  Recheck in 6 weeks    No results found for: THYROGLOBULN    No  results found for: THYROGLB    Lab Results   Component Value Date    TGRIA 2.7 09/30/2019    TGRIA <2.0 03/29/2019    TGRIA 3.2 05/21/2018       Lab Results   Component Value Date    THGAB 9.6 (H) 09/30/2019    THGAB 14.5 (H) 03/29/2019    THGAB 138.3 (H) 05/21/2018       Lab Results   Component Value Date    TSH 0.024 (L) 12/03/2021    TSH 7.460 (H) 09/30/2019    TSH 2.460 03/29/2019       Lab Results   Component Value Date    FREET4 1.08 09/30/2019    FREET4 0.80 05/21/2018          Low iron , on prenatal, taking apart from synthroid - no longer          --    Lab Results   Component Value Date    IRON 23 (L) 12/03/2021    TIBC 462 12/03/2021    FERRITIN 20.40 12/03/2021       Low iron persists   Doing ferrous sulfate 325 mg more per day but take later in the day     Nl copper  --    Lab Results   Component Value Date    [id number] 46.5 09/30/2019    [id number] 27.7 (L) 03/29/2019           Low vitamin D and low calcium  Suggest 50 th u of vitamin D twice monthly and calcium citrate 400 mg with supper    Repeat labs before next appt           4. No follow-ups on file.           LABS IN 6 WEEKS AND I WILL CALL     APPT IN 4 MONTHS

## 2021-12-09 LAB
THYROGLOB AB SERPL-ACNC: 3.9 IU/ML (ref 0–0.9)
THYROGLOB SERPL-MCNC: <0.2 NG/ML (ref 1.5–38.5)

## 2022-04-15 ENCOUNTER — LAB (OUTPATIENT)
Dept: LAB | Facility: HOSPITAL | Age: 42
End: 2022-04-15

## 2022-04-15 ENCOUNTER — OFFICE VISIT (OUTPATIENT)
Dept: ENDOCRINOLOGY | Facility: CLINIC | Age: 42
End: 2022-04-15

## 2022-04-15 VITALS
SYSTOLIC BLOOD PRESSURE: 110 MMHG | HEIGHT: 64 IN | OXYGEN SATURATION: 100 % | WEIGHT: 236 LBS | DIASTOLIC BLOOD PRESSURE: 80 MMHG | BODY MASS INDEX: 40.29 KG/M2 | HEART RATE: 72 BPM

## 2022-04-15 DIAGNOSIS — D50.8 OTHER IRON DEFICIENCY ANEMIA: ICD-10-CM

## 2022-04-15 DIAGNOSIS — E89.0 POSTOPERATIVE HYPOTHYROIDISM: Primary | ICD-10-CM

## 2022-04-15 DIAGNOSIS — E55.9 VITAMIN D DEFICIENCY: ICD-10-CM

## 2022-04-15 DIAGNOSIS — C73 THYROID CANCER: ICD-10-CM

## 2022-04-15 LAB
ALBUMIN SERPL-MCNC: 4.2 G/DL (ref 3.5–5.2)
ALBUMIN/GLOB SERPL: 1.1 G/DL
ALP SERPL-CCNC: 99 U/L (ref 39–117)
ALT SERPL W P-5'-P-CCNC: 17 U/L (ref 1–33)
ANION GAP SERPL CALCULATED.3IONS-SCNC: 14 MMOL/L (ref 5–15)
AST SERPL-CCNC: 30 U/L (ref 1–32)
BASOPHILS # BLD AUTO: 0.01 10*3/MM3 (ref 0–0.2)
BASOPHILS NFR BLD AUTO: 0.2 % (ref 0–1.5)
BILIRUB SERPL-MCNC: 0.4 MG/DL (ref 0–1.2)
BUN SERPL-MCNC: 7 MG/DL (ref 6–20)
BUN/CREAT SERPL: 9.2 (ref 7–25)
CALCIUM SPEC-SCNC: 8.5 MG/DL (ref 8.6–10.5)
CHLORIDE SERPL-SCNC: 101 MMOL/L (ref 98–107)
CO2 SERPL-SCNC: 22 MMOL/L (ref 22–29)
CREAT SERPL-MCNC: 0.76 MG/DL (ref 0.57–1)
DEPRECATED RDW RBC AUTO: 61.1 FL (ref 37–54)
EGFRCR SERPLBLD CKD-EPI 2021: 102.4 ML/MIN/1.73
EOSINOPHIL # BLD AUTO: 0.09 10*3/MM3 (ref 0–0.4)
EOSINOPHIL NFR BLD AUTO: 1.7 % (ref 0.3–6.2)
ERYTHROCYTE [DISTWIDTH] IN BLOOD BY AUTOMATED COUNT: 22.9 % (ref 12.3–15.4)
GLOBULIN UR ELPH-MCNC: 4 GM/DL
GLUCOSE SERPL-MCNC: 81 MG/DL (ref 65–99)
HCT VFR BLD AUTO: 34 % (ref 34–46.6)
HGB BLD-MCNC: 10.2 G/DL (ref 12–15.9)
IMM GRANULOCYTES # BLD AUTO: 0.02 10*3/MM3 (ref 0–0.05)
IMM GRANULOCYTES NFR BLD AUTO: 0.4 % (ref 0–0.5)
LYMPHOCYTES # BLD AUTO: 2.65 10*3/MM3 (ref 0.7–3.1)
LYMPHOCYTES NFR BLD AUTO: 49.5 % (ref 19.6–45.3)
MCH RBC QN AUTO: 22.7 PG (ref 26.6–33)
MCHC RBC AUTO-ENTMCNC: 30 G/DL (ref 31.5–35.7)
MCV RBC AUTO: 75.6 FL (ref 79–97)
MONOCYTES # BLD AUTO: 0.32 10*3/MM3 (ref 0.1–0.9)
MONOCYTES NFR BLD AUTO: 6 % (ref 5–12)
NEUTROPHILS NFR BLD AUTO: 2.26 10*3/MM3 (ref 1.7–7)
NEUTROPHILS NFR BLD AUTO: 42.2 % (ref 42.7–76)
NRBC BLD AUTO-RTO: 0 /100 WBC (ref 0–0.2)
PLATELET # BLD AUTO: 313 10*3/MM3 (ref 140–450)
PMV BLD AUTO: 10.4 FL (ref 6–12)
POTASSIUM SERPL-SCNC: 3.5 MMOL/L (ref 3.5–5.2)
PROT SERPL-MCNC: 8.2 G/DL (ref 6–8.5)
RBC # BLD AUTO: 4.5 10*6/MM3 (ref 3.77–5.28)
SODIUM SERPL-SCNC: 137 MMOL/L (ref 136–145)
TSH SERPL DL<=0.05 MIU/L-ACNC: 0.64 UIU/ML (ref 0.27–4.2)
WBC NRBC COR # BLD: 5.35 10*3/MM3 (ref 3.4–10.8)

## 2022-04-15 PROCEDURE — 36415 COLL VENOUS BLD VENIPUNCTURE: CPT | Performed by: INTERNAL MEDICINE

## 2022-04-15 PROCEDURE — 83540 ASSAY OF IRON: CPT | Performed by: INTERNAL MEDICINE

## 2022-04-15 PROCEDURE — 82306 VITAMIN D 25 HYDROXY: CPT | Performed by: INTERNAL MEDICINE

## 2022-04-15 PROCEDURE — 99214 OFFICE O/P EST MOD 30 MIN: CPT | Performed by: INTERNAL MEDICINE

## 2022-04-15 PROCEDURE — 84466 ASSAY OF TRANSFERRIN: CPT | Performed by: INTERNAL MEDICINE

## 2022-04-15 PROCEDURE — 80050 GENERAL HEALTH PANEL: CPT | Performed by: INTERNAL MEDICINE

## 2022-04-15 PROCEDURE — 84432 ASSAY OF THYROGLOBULIN: CPT | Performed by: INTERNAL MEDICINE

## 2022-04-15 PROCEDURE — 86800 THYROGLOBULIN ANTIBODY: CPT | Performed by: INTERNAL MEDICINE

## 2022-04-15 PROCEDURE — 82728 ASSAY OF FERRITIN: CPT | Performed by: INTERNAL MEDICINE

## 2022-04-15 PROCEDURE — 82607 VITAMIN B-12: CPT | Performed by: INTERNAL MEDICINE

## 2022-04-15 RX ORDER — LEVOTHYROXINE SODIUM 0.12 MG/1
125 TABLET ORAL DAILY
Qty: 90 TABLET | Refills: 3 | Status: CANCELLED | OUTPATIENT
Start: 2022-04-15 | End: 2023-04-15

## 2022-04-15 NOTE — PROGRESS NOTES
"  Subjective    Vickie Whitten is a 41 y.o. female. she is here today for follow-up.                                          Hypothyroidism  Pertinent negatives include no abdominal pain, arthralgias, chest pain, coughing, diaphoresis, fatigue, headaches, joint swelling, myalgias, nausea, neck pain, numbness, rash, sore throat, vomiting or weakness.          Thyroid Cancer, Postoperative Hypothyroidism     Duration Thyroid Ca Dx in 12-16     Timing - Underwent total thyroidectomy in 12-16     Quality -  Right 2.5 cm Follicular Ca w capsular invasion but no lymphatic or vascular invasion. Left lobe w pathology for Hashimoto's      Severity -  Mild upon presentation      Complications - none     Current symptoms/problems  none      Alleviating Factors: Compliance with Brand Name synthroid     March 2018 - had 0.6 x 0.7 x 3.2 cm left residual thyroid tissue that had uptake on WBS  Underwent YOUNG ablation in April 2018 April 2019 and May 2020  , negative ultrasound    Tg diana while TSH was high but latest TSH low in March 2021 , Tg ab 3 , tg not measured , was taking biotin ,     PE    /80   Pulse 72   Ht 162.6 cm (64\")   Wt 107 kg (236 lb)   SpO2 100%   BMI 40.51 kg/m²   AOx3  No  goiter, no palpable masses  Normal respiratory effort  No Edema    Labs    Lab Results   Component Value Date    WBC 4.73 12/03/2021    HGB 10.0 (L) 12/03/2021    HCT 33.8 (L) 12/03/2021    MCV 78.6 (L) 12/03/2021     12/03/2021     Lab Results   Component Value Date    GLUCOSE 80 12/03/2021    BUN 9 12/03/2021    CREATININE 0.81 12/03/2021    EGFRIFAFRI 95 12/03/2021    BCR 11.1 12/03/2021    K 4.1 12/03/2021    CO2 26.1 12/03/2021    CALCIUM 9.1 12/03/2021    ALBUMIN 3.90 12/03/2021    AST 35 (H) 12/03/2021    ALT 23 12/03/2021           Thyroglobulin by BETH  No results found for: THYROGLOBULN    Thyroglobulin by LCMS  Lab Results   Component Value Date    THYROGLB <0.2 (L) 12/03/2021       Thyroglobulin by DAE   Lab " Results   Component Value Date    TGRIA 2.7 09/30/2019    TGRIA <2.0 03/29/2019    TGRIA 3.2 05/21/2018       Lab Results   Component Value Date    THGAB 3.9 (H) 12/03/2021    THGAB 9.6 (H) 09/30/2019    THGAB 14.5 (H) 03/29/2019       Lab Results   Component Value Date    TSH 0.024 (L) 12/03/2021    TSH 7.460 (H) 09/30/2019    TSH 2.460 03/29/2019       Lab Results   Component Value Date    FREET4 1.08 09/30/2019    FREET4 0.80 05/21/2018          Assessment/Plan      1. Postoperative hypothyroidism    2. History of thyroid cancer    3. Other iron deficiency anemia    4. Vitamin D deficiency         Hypothyroidism postsurgical    Thyroid Cancer , Low Risk, excellent response    Last dose adjustment , brand name 150 to 125   Check today   Stops biotin before testing       No results found for: THYROGLOBULN    Lab Results   Component Value Date    THYROGLB <0.2 (L) 12/03/2021       Lab Results   Component Value Date    TGRIA 2.7 09/30/2019    TGRIA <2.0 03/29/2019    TGRIA 3.2 05/21/2018       Lab Results   Component Value Date    THGAB 3.9 (H) 12/03/2021    THGAB 9.6 (H) 09/30/2019    THGAB 14.5 (H) 03/29/2019       Lab Results   Component Value Date    TSH 0.024 (L) 12/03/2021    TSH 7.460 (H) 09/30/2019    TSH 2.460 03/29/2019       Lab Results   Component Value Date    FREET4 1.08 09/30/2019    FREET4 0.80 05/21/2018          Low iron , on prenatal, taking apart from synthroid - no longer          --    Lab Results   Component Value Date    IRON 23 (L) 12/03/2021    TIBC 462 12/03/2021    FERRITIN 20.40 12/03/2021       Low iron persists   Doing ferrous sulfate 325 mg more per day but take later in the day     Nl copper  --    Lab Results   Component Value Date    [id number] 46.5 09/30/2019    GYWJ22TC 27.7 (L) 03/29/2019           Low vitamin D and low calcium  Suggest 50 th u of vitamin D twice monthly and calcium citrate 400 mg with supper            CALL HER W LABS AND THEN SEND RX TO [facility]

## 2022-04-16 DIAGNOSIS — D50.8 OTHER IRON DEFICIENCY ANEMIA: ICD-10-CM

## 2022-04-16 DIAGNOSIS — C73 THYROID CANCER: ICD-10-CM

## 2022-04-16 DIAGNOSIS — E89.0 POSTOPERATIVE HYPOTHYROIDISM: Primary | ICD-10-CM

## 2022-04-16 DIAGNOSIS — E55.9 VITAMIN D DEFICIENCY: ICD-10-CM

## 2022-04-16 LAB
25(OH)D3 SERPL-MCNC: 41.8 NG/ML (ref 30–100)
FERRITIN SERPL-MCNC: 27.1 NG/ML (ref 13–150)
IRON 24H UR-MRATE: 24 MCG/DL (ref 37–145)
IRON SATN MFR SERPL: 5 % (ref 20–50)
TIBC SERPL-MCNC: 437 MCG/DL (ref 298–536)
TRANSFERRIN SERPL-MCNC: 293 MG/DL (ref 200–360)
VIT B12 BLD-MCNC: 486 PG/ML (ref 211–946)

## 2022-04-16 RX ORDER — LEVOTHYROXINE SODIUM 0.12 MG/1
TABLET ORAL
Qty: 90 TABLET | Refills: 3 | Status: SHIPPED | OUTPATIENT
Start: 2022-04-16 | End: 2023-03-28 | Stop reason: SDUPTHER

## 2022-04-27 ENCOUNTER — HOSPITAL ENCOUNTER (OUTPATIENT)
Dept: ULTRASOUND IMAGING | Facility: HOSPITAL | Age: 42
Discharge: HOME | End: 2022-04-27

## 2022-04-27 ENCOUNTER — LAB (OUTPATIENT)
Dept: LAB | Facility: HOSPITAL | Age: 42
End: 2022-04-27

## 2022-04-27 LAB
THYROGLOB AB SERPL-ACNC: 4.5 IU/ML (ref 0–0.9)
THYROGLOB SERPL-MCNC: <0.2 NG/ML (ref 1.5–38.5)

## 2022-04-27 PROCEDURE — 76536 US EXAM OF HEAD AND NECK: CPT

## 2022-05-31 NOTE — TELEPHONE ENCOUNTER
----- Message from KHADIJAH Sapp sent at 5/23/2018  1:35 PM CDT -----  Iron is really low she needs to be taking iron 325 mg bid   Other

## 2023-02-14 RX ORDER — LEVOTHYROXINE SODIUM 0.12 MG/1
TABLET ORAL
Qty: 90 TABLET | Refills: 3 | OUTPATIENT
Start: 2023-02-14

## 2023-03-20 ENCOUNTER — LAB (OUTPATIENT)
Dept: LAB | Facility: HOSPITAL | Age: 43
End: 2023-03-20
Payer: COMMERCIAL

## 2023-03-20 LAB
ALBUMIN SERPL-MCNC: 4.3 G/DL (ref 3.5–5.2)
ALBUMIN/GLOB SERPL: 1 G/DL
ALP SERPL-CCNC: 106 U/L (ref 39–117)
ALT SERPL W P-5'-P-CCNC: 16 U/L (ref 1–33)
ANION GAP SERPL CALCULATED.3IONS-SCNC: 11 MMOL/L (ref 5–15)
ANISOCYTOSIS BLD QL: ABNORMAL
AST SERPL-CCNC: 28 U/L (ref 1–32)
BILIRUB SERPL-MCNC: 0.4 MG/DL (ref 0–1.2)
BUN SERPL-MCNC: 7 MG/DL (ref 6–20)
BUN/CREAT SERPL: 10.4 (ref 7–25)
CALCIUM SPEC-SCNC: 8.9 MG/DL (ref 8.6–10.5)
CHLORIDE SERPL-SCNC: 101 MMOL/L (ref 98–107)
CO2 SERPL-SCNC: 25 MMOL/L (ref 22–29)
CREAT SERPL-MCNC: 0.67 MG/DL (ref 0.57–1)
DEPRECATED RDW RBC AUTO: 66.9 FL (ref 37–54)
EGFRCR SERPLBLD CKD-EPI 2021: 113.5 ML/MIN/1.73
EOSINOPHIL # BLD MANUAL: 0.05 10*3/MM3 (ref 0–0.4)
EOSINOPHIL NFR BLD MANUAL: 1 % (ref 0.3–6.2)
ERYTHROCYTE [DISTWIDTH] IN BLOOD BY AUTOMATED COUNT: 24.9 % (ref 12.3–15.4)
FERRITIN SERPL-MCNC: 12.39 NG/ML (ref 13–150)
GLOBULIN UR ELPH-MCNC: 4.3 GM/DL
GLUCOSE SERPL-MCNC: 80 MG/DL (ref 65–99)
HCT VFR BLD AUTO: 32.4 % (ref 34–46.6)
HGB BLD-MCNC: 9.2 G/DL (ref 12–15.9)
HYPOCHROMIA BLD QL: ABNORMAL
IRON 24H UR-MRATE: 19 MCG/DL (ref 37–145)
IRON SATN MFR SERPL: 4 % (ref 20–50)
LYMPHOCYTES # BLD MANUAL: 2.13 10*3/MM3 (ref 0.7–3.1)
LYMPHOCYTES NFR BLD MANUAL: 2 % (ref 5–12)
MCH RBC QN AUTO: 21.2 PG (ref 26.6–33)
MCHC RBC AUTO-ENTMCNC: 28.4 G/DL (ref 31.5–35.7)
MCV RBC AUTO: 74.7 FL (ref 79–97)
MONOCYTES # BLD: 0.09 10*3/MM3 (ref 0.1–0.9)
NEUTROPHILS # BLD AUTO: 2.36 10*3/MM3 (ref 1.7–7)
NEUTROPHILS NFR BLD MANUAL: 51 % (ref 42.7–76)
PLAT MORPH BLD: NORMAL
PLATELET # BLD AUTO: 359 10*3/MM3 (ref 140–450)
PMV BLD AUTO: 9.6 FL (ref 6–12)
POIKILOCYTOSIS BLD QL SMEAR: ABNORMAL
POLYCHROMASIA BLD QL SMEAR: ABNORMAL
POTASSIUM SERPL-SCNC: 3.3 MMOL/L (ref 3.5–5.2)
PROT SERPL-MCNC: 8.6 G/DL (ref 6–8.5)
RBC # BLD AUTO: 4.34 10*6/MM3 (ref 3.77–5.28)
SODIUM SERPL-SCNC: 137 MMOL/L (ref 136–145)
TIBC SERPL-MCNC: 483 MCG/DL (ref 298–536)
TRANSFERRIN SERPL-MCNC: 324 MG/DL (ref 200–360)
TSH SERPL DL<=0.05 MIU/L-ACNC: 0.96 UIU/ML (ref 0.27–4.2)
VARIANT LYMPHS NFR BLD MANUAL: 4 % (ref 0–5)
VARIANT LYMPHS NFR BLD MANUAL: 42 % (ref 19.6–45.3)
WBC MORPH BLD: NORMAL
WBC NRBC COR # BLD: 4.62 10*3/MM3 (ref 3.4–10.8)

## 2023-03-20 PROCEDURE — 84432 ASSAY OF THYROGLOBULIN: CPT | Performed by: INTERNAL MEDICINE

## 2023-03-20 PROCEDURE — 85007 BL SMEAR W/DIFF WBC COUNT: CPT | Performed by: INTERNAL MEDICINE

## 2023-03-20 PROCEDURE — 86800 THYROGLOBULIN ANTIBODY: CPT | Performed by: INTERNAL MEDICINE

## 2023-03-20 PROCEDURE — 36415 COLL VENOUS BLD VENIPUNCTURE: CPT | Performed by: INTERNAL MEDICINE

## 2023-03-20 PROCEDURE — 83540 ASSAY OF IRON: CPT | Performed by: INTERNAL MEDICINE

## 2023-03-20 PROCEDURE — 84466 ASSAY OF TRANSFERRIN: CPT | Performed by: INTERNAL MEDICINE

## 2023-03-20 PROCEDURE — 80050 GENERAL HEALTH PANEL: CPT | Performed by: INTERNAL MEDICINE

## 2023-03-20 PROCEDURE — 82728 ASSAY OF FERRITIN: CPT | Performed by: INTERNAL MEDICINE

## 2023-03-25 LAB
THYROGLOB AB SERPL-ACNC: 2.9 IU/ML (ref 0–0.9)
THYROGLOB SERPL-MCNC: <0.2 NG/ML (ref 1.5–38.5)

## 2023-03-28 ENCOUNTER — TELEMEDICINE (OUTPATIENT)
Dept: ENDOCRINOLOGY | Facility: CLINIC | Age: 43
End: 2023-03-28
Payer: COMMERCIAL

## 2023-03-28 DIAGNOSIS — E55.9 VITAMIN D DEFICIENCY: ICD-10-CM

## 2023-03-28 DIAGNOSIS — D50.8 OTHER IRON DEFICIENCY ANEMIA: ICD-10-CM

## 2023-03-28 DIAGNOSIS — Z85.850 HISTORY OF THYROID CANCER: ICD-10-CM

## 2023-03-28 DIAGNOSIS — E89.0 POSTOPERATIVE HYPOTHYROIDISM: Primary | ICD-10-CM

## 2023-03-28 RX ORDER — LEVOTHYROXINE SODIUM 0.12 MG/1
TABLET ORAL
Qty: 90 TABLET | Refills: 3 | Status: SHIPPED | OUTPATIENT
Start: 2023-03-28

## 2023-03-28 NOTE — PROGRESS NOTES
Subjective    Vickie Whitten is a 42 y.o. female. she is here today for follow-up.                                                                      This was a Telehealth Encounter. Benefits and Disadvantages of a Telehealth Visit were discussed and accepted by patient.  Mode of Visit: Video  Location of patient: Home  You have chosen to receive care through a telehealth visit.  Does the patient consent to use a video/audio connection for your medical care today? Yes  The visit included audio and video interaction. No technical issues occurred during this visit        Thyroid Cancer, Postoperative Hypothyroidism     Duration Thyroid Ca Dx in 12-16     Timing - Underwent total thyroidectomy in 12-16     Quality -  Right 2.5 cm Follicular Ca w capsular invasion but no lymphatic or vascular invasion. Left lobe w pathology for Hashimoto's      Severity -  Mild upon presentation      Complications - none     Current symptoms/problems  fatigue, weakness       Alleviating Factors: Compliance with Brand Name synthroid     March 2018 - had 0.6 x 0.7 x 3.2 cm left residual thyroid tissue that had uptake on WBS  Underwent YOUNG ablation in April 2018 April 2019 and May 2020  , negative ultrasound    Tg diana while TSH was high but latest Tg is low     PE    There were no vitals taken for this visit.  AOx3  No  Visible goiter   Normal respiratory effort  No Edema    Labs    Lab Results   Component Value Date    WBC 4.62 03/20/2023    HGB 9.2 (L) 03/20/2023    HCT 32.4 (L) 03/20/2023    MCV 74.7 (L) 03/20/2023     03/20/2023     Lab Results   Component Value Date    GLUCOSE 80 03/20/2023    BUN 7 03/20/2023    CREATININE 0.67 03/20/2023    EGFRIFAFRI 95 12/03/2021    BCR 10.4 03/20/2023    K 3.3 (L) 03/20/2023    CO2 25.0 03/20/2023    CALCIUM 8.9 03/20/2023    ALBUMIN 4.3 03/20/2023    AST 28 03/20/2023    ALT 16 03/20/2023           Thyroglobulin by BETH  No results found for: THYROGLOBULN    Thyroglobulin by LCMS  Lab  Results   Component Value Date    THYROGLB <0.2 (L) 03/20/2023    THYROGLB <0.2 (L) 04/15/2022    THYROGLB <0.2 (L) 12/03/2021       Thyroglobulin by DAE   Lab Results   Component Value Date    TGRIA 2.7 09/30/2019    TGRIA <2.0 03/29/2019    TGRIA 3.2 05/21/2018       Lab Results   Component Value Date    THGAB 2.9 (H) 03/20/2023    THGAB 4.5 (H) 04/15/2022    THGAB 3.9 (H) 12/03/2021       Lab Results   Component Value Date    TSH 0.961 03/20/2023    TSH 0.636 04/15/2022    TSH 0.024 (L) 12/03/2021       Lab Results   Component Value Date    FREET4 1.08 09/30/2019    FREET4 0.80 05/21/2018          Assessment & Plan      1. Postoperative hypothyroidism    2. History of thyroid cancer    3. Vitamin D deficiency    4. Other iron deficiency anemia         Hypothyroidism postsurgical    History of Thyroid Cancer , Low Risk, excellent response    Last dose adjustment , brand name 150 to 125      Stops biotin before testing       No results found for: THYROGLOBULN    Lab Results   Component Value Date    THYROGLB <0.2 (L) 03/20/2023    THYROGLB <0.2 (L) 04/15/2022    THYROGLB <0.2 (L) 12/03/2021       Lab Results   Component Value Date    TGRIA 2.7 09/30/2019    TGRIA <2.0 03/29/2019    TGRIA 3.2 05/21/2018       Lab Results   Component Value Date    THGAB 2.9 (H) 03/20/2023    THGAB 4.5 (H) 04/15/2022    THGAB 3.9 (H) 12/03/2021       Lab Results   Component Value Date    TSH 0.961 03/20/2023    TSH 0.636 04/15/2022    TSH 0.024 (L) 12/03/2021       Lab Results   Component Value Date    FREET4 1.08 09/30/2019    FREET4 0.80 05/21/2018          Low iron , on prenatal, taking apart from synthroid - no longer          --    Lab Results   Component Value Date    IRON 19 (L) 03/20/2023    TIBC 483 03/20/2023    FERRITIN 12.39 (L) 03/20/2023       Low iron persists   Doing ferrous sulfate 325 mg more per day but take later in the day     Nl copper    She has menorrhagia, states will consult w gynecology   --    Lab Results    Component Value Date    OPDR08MD 41.8 04/15/2022    XRCN94XR 46.5 2019           Low vitamin D and low calcium  Suggest 50 th u of vitamin D twice monthly and calcium citrate 400 mg with supper    Keep this        New Medications Ordered This Visit   Medications   • levothyroxine (Synthroid) 125 MCG tablet     Si tab po daily     Dispense:  90 tablet     Refill:  3   • Cholecalciferol 1.25 MG (76211 UT) tablet     Si thousand units po q 2 weeks     Dispense:  6 tablet     Refill:  11       No orders of the defined types were placed in this encounter.            This document has been electronically signed by Jose M Taylor MD on 2023 15:51 CDT

## 2024-03-18 ENCOUNTER — LAB (OUTPATIENT)
Dept: LAB | Facility: HOSPITAL | Age: 44
End: 2024-03-18
Payer: COMMERCIAL

## 2024-03-18 DIAGNOSIS — E89.0 POSTOPERATIVE HYPOTHYROIDISM: ICD-10-CM

## 2024-03-18 DIAGNOSIS — E55.9 VITAMIN D DEFICIENCY: ICD-10-CM

## 2024-03-18 DIAGNOSIS — Z85.850 HISTORY OF THYROID CANCER: Primary | ICD-10-CM

## 2024-03-18 DIAGNOSIS — D50.8 OTHER IRON DEFICIENCY ANEMIA: ICD-10-CM

## 2024-03-18 LAB
ALBUMIN SERPL-MCNC: 4.1 G/DL (ref 3.5–5.2)
ALBUMIN/GLOB SERPL: 1 G/DL
ALP SERPL-CCNC: 102 U/L (ref 39–117)
ALT SERPL W P-5'-P-CCNC: 15 U/L (ref 1–33)
ANION GAP SERPL CALCULATED.3IONS-SCNC: 9 MMOL/L (ref 5–15)
ANISOCYTOSIS BLD QL: ABNORMAL
AST SERPL-CCNC: 25 U/L (ref 1–32)
BILIRUB SERPL-MCNC: 0.3 MG/DL (ref 0–1.2)
BUN SERPL-MCNC: 6 MG/DL (ref 6–20)
BUN/CREAT SERPL: 9.2 (ref 7–25)
CALCIUM SPEC-SCNC: 8.2 MG/DL (ref 8.6–10.5)
CHLORIDE SERPL-SCNC: 104 MMOL/L (ref 98–107)
CO2 SERPL-SCNC: 26 MMOL/L (ref 22–29)
CREAT SERPL-MCNC: 0.65 MG/DL (ref 0.57–1)
DEPRECATED RDW RBC AUTO: 57.3 FL (ref 37–54)
EGFRCR SERPLBLD CKD-EPI 2021: 113.6 ML/MIN/1.73
EOSINOPHIL # BLD MANUAL: 0.11 10*3/MM3 (ref 0–0.4)
EOSINOPHIL NFR BLD MANUAL: 2.1 % (ref 0.3–6.2)
ERYTHROCYTE [DISTWIDTH] IN BLOOD BY AUTOMATED COUNT: 25.5 % (ref 12.3–15.4)
FERRITIN SERPL-MCNC: 11.62 NG/ML (ref 13–150)
GLOBULIN UR ELPH-MCNC: 4 GM/DL
GLUCOSE SERPL-MCNC: 77 MG/DL (ref 65–99)
HCT VFR BLD AUTO: 27.3 % (ref 34–46.6)
HGB BLD-MCNC: 7.3 G/DL (ref 12–15.9)
HYPOCHROMIA BLD QL: ABNORMAL
IRON 24H UR-MRATE: 19 MCG/DL (ref 37–145)
IRON SATN MFR SERPL: 4 % (ref 20–50)
LYMPHOCYTES # BLD MANUAL: 1.46 10*3/MM3 (ref 0.7–3.1)
LYMPHOCYTES NFR BLD MANUAL: 2.1 % (ref 5–12)
MCH RBC QN AUTO: 17.3 PG (ref 26.6–33)
MCHC RBC AUTO-ENTMCNC: 26.7 G/DL (ref 31.5–35.7)
MCV RBC AUTO: 64.8 FL (ref 79–97)
MICROCYTES BLD QL: ABNORMAL
MONOCYTES # BLD: 0.11 10*3/MM3 (ref 0.1–0.9)
NEUTROPHILS # BLD AUTO: 3.65 10*3/MM3 (ref 1.7–7)
NEUTROPHILS NFR BLD MANUAL: 68.4 % (ref 42.7–76)
PLAT MORPH BLD: NORMAL
PLATELET # BLD AUTO: 378 10*3/MM3 (ref 140–450)
PMV BLD AUTO: 9 FL (ref 6–12)
POIKILOCYTOSIS BLD QL SMEAR: ABNORMAL
POLYCHROMASIA BLD QL SMEAR: ABNORMAL
POTASSIUM SERPL-SCNC: 3.5 MMOL/L (ref 3.5–5.2)
PROT SERPL-MCNC: 8.1 G/DL (ref 6–8.5)
RBC # BLD AUTO: 4.21 10*6/MM3 (ref 3.77–5.28)
SCHISTOCYTES BLD QL SMEAR: ABNORMAL
SODIUM SERPL-SCNC: 139 MMOL/L (ref 136–145)
T4 FREE SERPL-MCNC: 1.27 NG/DL (ref 0.93–1.7)
TIBC SERPL-MCNC: 471 MCG/DL (ref 298–536)
TRANSFERRIN SERPL-MCNC: 316 MG/DL (ref 200–360)
TSH SERPL DL<=0.05 MIU/L-ACNC: 1.38 UIU/ML (ref 0.27–4.2)
VARIANT LYMPHS NFR BLD MANUAL: 27.4 % (ref 19.6–45.3)
WBC MORPH BLD: NORMAL
WBC NRBC COR # BLD AUTO: 5.34 10*3/MM3 (ref 3.4–10.8)

## 2024-03-18 PROCEDURE — 86800 THYROGLOBULIN ANTIBODY: CPT

## 2024-03-18 PROCEDURE — 84466 ASSAY OF TRANSFERRIN: CPT

## 2024-03-18 PROCEDURE — 36415 COLL VENOUS BLD VENIPUNCTURE: CPT

## 2024-03-18 PROCEDURE — 84432 ASSAY OF THYROGLOBULIN: CPT

## 2024-03-18 PROCEDURE — 82728 ASSAY OF FERRITIN: CPT

## 2024-03-18 PROCEDURE — 80050 GENERAL HEALTH PANEL: CPT

## 2024-03-18 PROCEDURE — 83540 ASSAY OF IRON: CPT

## 2024-03-18 PROCEDURE — 84439 ASSAY OF FREE THYROXINE: CPT

## 2024-03-18 PROCEDURE — 85007 BL SMEAR W/DIFF WBC COUNT: CPT

## 2024-03-24 LAB
THYROGLOB AB SERPL-ACNC: 3.5 IU/ML (ref 0–0.9)
THYROGLOB SERPL-MCNC: <0.2 NG/ML (ref 1.5–38.5)

## 2025-06-19 ENCOUNTER — TRANSCRIBE ORDERS (OUTPATIENT)
Dept: ADMINISTRATIVE | Facility: HOSPITAL | Age: 45
End: 2025-06-19
Payer: COMMERCIAL

## 2025-06-19 ENCOUNTER — LAB (OUTPATIENT)
Dept: LAB | Facility: HOSPITAL | Age: 45
End: 2025-06-19
Payer: COMMERCIAL

## 2025-06-19 DIAGNOSIS — E89.0 POSTOPERATIVE HYPOTHYROIDISM: Primary | ICD-10-CM

## 2025-06-19 DIAGNOSIS — E55.9 VITAMIN D DEFICIENCY: ICD-10-CM

## 2025-06-19 DIAGNOSIS — Z85.850 HISTORY OF THYROID CANCER: ICD-10-CM

## 2025-06-19 DIAGNOSIS — E61.1 IRON DEFICIENCY: ICD-10-CM

## 2025-06-19 LAB
25(OH)D3 SERPL-MCNC: 54.2 NG/ML (ref 30–100)
ALBUMIN SERPL-MCNC: 4.1 G/DL (ref 3.5–5.2)
ALBUMIN/GLOB SERPL: 1.1 G/DL
ALP SERPL-CCNC: 84 U/L (ref 39–117)
ALT SERPL W P-5'-P-CCNC: 20 U/L (ref 1–33)
ANION GAP SERPL CALCULATED.3IONS-SCNC: 11 MMOL/L (ref 5–15)
AST SERPL-CCNC: 37 U/L (ref 1–32)
BASOPHILS # BLD AUTO: 0.01 10*3/MM3 (ref 0–0.2)
BASOPHILS NFR BLD AUTO: 0.2 % (ref 0–1.5)
BILIRUB SERPL-MCNC: 0.6 MG/DL (ref 0–1.2)
BUN SERPL-MCNC: 6.6 MG/DL (ref 6–20)
BUN/CREAT SERPL: 8.3 (ref 7–25)
CALCIUM SPEC-SCNC: 8.9 MG/DL (ref 8.6–10.5)
CHLORIDE SERPL-SCNC: 103 MMOL/L (ref 98–107)
CO2 SERPL-SCNC: 24 MMOL/L (ref 22–29)
CREAT SERPL-MCNC: 0.8 MG/DL (ref 0.57–1)
DEPRECATED RDW RBC AUTO: 45.4 FL (ref 37–54)
EGFRCR SERPLBLD CKD-EPI 2021: 94.5 ML/MIN/1.73
EOSINOPHIL # BLD AUTO: 0.08 10*3/MM3 (ref 0–0.4)
EOSINOPHIL NFR BLD AUTO: 1.8 % (ref 0.3–6.2)
ERYTHROCYTE [DISTWIDTH] IN BLOOD BY AUTOMATED COUNT: 14.2 % (ref 12.3–15.4)
GLOBULIN UR ELPH-MCNC: 3.8 GM/DL
GLUCOSE SERPL-MCNC: 86 MG/DL (ref 65–99)
HCT VFR BLD AUTO: 38.7 % (ref 34–46.6)
HGB BLD-MCNC: 12.6 G/DL (ref 12–15.9)
IMM GRANULOCYTES # BLD AUTO: 0.01 10*3/MM3 (ref 0–0.05)
IMM GRANULOCYTES NFR BLD AUTO: 0.2 % (ref 0–0.5)
LYMPHOCYTES # BLD AUTO: 1.94 10*3/MM3 (ref 0.7–3.1)
LYMPHOCYTES NFR BLD AUTO: 44.6 % (ref 19.6–45.3)
MCH RBC QN AUTO: 28.8 PG (ref 26.6–33)
MCHC RBC AUTO-ENTMCNC: 32.6 G/DL (ref 31.5–35.7)
MCV RBC AUTO: 88.6 FL (ref 79–97)
MONOCYTES # BLD AUTO: 0.28 10*3/MM3 (ref 0.1–0.9)
MONOCYTES NFR BLD AUTO: 6.4 % (ref 5–12)
NEUTROPHILS NFR BLD AUTO: 2.03 10*3/MM3 (ref 1.7–7)
NEUTROPHILS NFR BLD AUTO: 46.8 % (ref 42.7–76)
NRBC BLD AUTO-RTO: 0 /100 WBC (ref 0–0.2)
PLATELET # BLD AUTO: 209 10*3/MM3 (ref 140–450)
PMV BLD AUTO: 10.7 FL (ref 6–12)
POTASSIUM SERPL-SCNC: 3.4 MMOL/L (ref 3.5–5.2)
PROT SERPL-MCNC: 7.9 G/DL (ref 6–8.5)
RBC # BLD AUTO: 4.37 10*6/MM3 (ref 3.77–5.28)
SODIUM SERPL-SCNC: 138 MMOL/L (ref 136–145)
TSH SERPL DL<=0.05 MIU/L-ACNC: 3.05 UIU/ML (ref 0.27–4.2)
WBC NRBC COR # BLD AUTO: 4.35 10*3/MM3 (ref 3.4–10.8)

## 2025-06-19 PROCEDURE — 80050 GENERAL HEALTH PANEL: CPT | Performed by: INTERNAL MEDICINE

## 2025-06-19 PROCEDURE — 36415 COLL VENOUS BLD VENIPUNCTURE: CPT | Performed by: INTERNAL MEDICINE

## 2025-06-19 PROCEDURE — 86800 THYROGLOBULIN ANTIBODY: CPT | Performed by: INTERNAL MEDICINE

## 2025-06-19 PROCEDURE — 82306 VITAMIN D 25 HYDROXY: CPT | Performed by: INTERNAL MEDICINE

## 2025-06-19 PROCEDURE — 84432 ASSAY OF THYROGLOBULIN: CPT | Performed by: INTERNAL MEDICINE

## 2025-06-20 LAB
THYROGLOB AB SERPL-ACNC: <1 IU/ML (ref 0–0.9)
THYROGLOB SERPL-MCNC: <0.1 NG/ML (ref 1.5–38.5)